# Patient Record
Sex: FEMALE | Race: WHITE | NOT HISPANIC OR LATINO | URBAN - METROPOLITAN AREA
[De-identification: names, ages, dates, MRNs, and addresses within clinical notes are randomized per-mention and may not be internally consistent; named-entity substitution may affect disease eponyms.]

---

## 2018-05-26 ENCOUNTER — INPATIENT (INPATIENT)
Facility: HOSPITAL | Age: 53
LOS: 4 days | Discharge: HOME HEALTH | End: 2018-05-31
Attending: HOSPITALIST | Admitting: HOSPITALIST
Payer: MEDICARE

## 2018-05-26 VITALS
RESPIRATION RATE: 16 BRPM | SYSTOLIC BLOOD PRESSURE: 161 MMHG | HEART RATE: 66 BPM | OXYGEN SATURATION: 95 % | TEMPERATURE: 99 F | DIASTOLIC BLOOD PRESSURE: 102 MMHG

## 2018-05-26 DIAGNOSIS — Z29.9 ENCOUNTER FOR PROPHYLACTIC MEASURES, UNSPECIFIED: ICD-10-CM

## 2018-05-26 DIAGNOSIS — Z98.890 OTHER SPECIFIED POSTPROCEDURAL STATES: Chronic | ICD-10-CM

## 2018-05-26 DIAGNOSIS — R78.81 BACTEREMIA: ICD-10-CM

## 2018-05-26 DIAGNOSIS — N39.0 URINARY TRACT INFECTION, SITE NOT SPECIFIED: ICD-10-CM

## 2018-05-26 DIAGNOSIS — Z90.49 ACQUIRED ABSENCE OF OTHER SPECIFIED PARTS OF DIGESTIVE TRACT: Chronic | ICD-10-CM

## 2018-05-26 DIAGNOSIS — Z93.6 OTHER ARTIFICIAL OPENINGS OF URINARY TRACT STATUS: Chronic | ICD-10-CM

## 2018-05-26 DIAGNOSIS — R79.89 OTHER SPECIFIED ABNORMAL FINDINGS OF BLOOD CHEMISTRY: ICD-10-CM

## 2018-05-26 DIAGNOSIS — E03.9 HYPOTHYROIDISM, UNSPECIFIED: ICD-10-CM

## 2018-05-26 DIAGNOSIS — I10 ESSENTIAL (PRIMARY) HYPERTENSION: ICD-10-CM

## 2018-05-26 DIAGNOSIS — K91.2 POSTSURGICAL MALABSORPTION, NOT ELSEWHERE CLASSIFIED: ICD-10-CM

## 2018-05-26 LAB
ALBUMIN SERPL ELPH-MCNC: 4.2 G/DL — SIGNIFICANT CHANGE UP (ref 3.3–5)
ALP SERPL-CCNC: 145 U/L — HIGH (ref 40–120)
ALT FLD-CCNC: 39 U/L — HIGH (ref 4–33)
APPEARANCE UR: SIGNIFICANT CHANGE UP
AST SERPL-CCNC: 45 U/L — HIGH (ref 4–32)
BACTERIA # UR AUTO: SIGNIFICANT CHANGE UP
BASE EXCESS BLDV CALC-SCNC: 2.7 MMOL/L — SIGNIFICANT CHANGE UP
BASOPHILS # BLD AUTO: 0.04 K/UL — SIGNIFICANT CHANGE UP (ref 0–0.2)
BASOPHILS NFR BLD AUTO: 0.7 % — SIGNIFICANT CHANGE UP (ref 0–2)
BILIRUB SERPL-MCNC: 0.3 MG/DL — SIGNIFICANT CHANGE UP (ref 0.2–1.2)
BILIRUB UR-MCNC: NEGATIVE — SIGNIFICANT CHANGE UP
BLOOD GAS VENOUS - CREATININE: 1.42 MG/DL — HIGH (ref 0.5–1.3)
BLOOD UR QL VISUAL: HIGH
BUN SERPL-MCNC: 30 MG/DL — HIGH (ref 7–23)
CALCIUM SERPL-MCNC: 9.3 MG/DL — SIGNIFICANT CHANGE UP (ref 8.4–10.5)
CHLORIDE BLDV-SCNC: 103 MMOL/L — SIGNIFICANT CHANGE UP (ref 96–108)
CHLORIDE SERPL-SCNC: 97 MMOL/L — LOW (ref 98–107)
CO2 SERPL-SCNC: 23 MMOL/L — SIGNIFICANT CHANGE UP (ref 22–31)
COLOR SPEC: YELLOW — SIGNIFICANT CHANGE UP
CREAT SERPL-MCNC: 1.41 MG/DL — HIGH (ref 0.5–1.3)
EOSINOPHIL # BLD AUTO: 0.2 K/UL — SIGNIFICANT CHANGE UP (ref 0–0.5)
EOSINOPHIL NFR BLD AUTO: 3.6 % — SIGNIFICANT CHANGE UP (ref 0–6)
GAS PNL BLDV: 137 MMOL/L — SIGNIFICANT CHANGE UP (ref 136–146)
GLUCOSE BLDV-MCNC: 91 — SIGNIFICANT CHANGE UP (ref 70–99)
GLUCOSE SERPL-MCNC: 92 MG/DL — SIGNIFICANT CHANGE UP (ref 70–99)
GLUCOSE UR-MCNC: NEGATIVE — SIGNIFICANT CHANGE UP
HCO3 BLDV-SCNC: 25 MMOL/L — SIGNIFICANT CHANGE UP (ref 20–27)
HCT VFR BLD CALC: 40.6 % — SIGNIFICANT CHANGE UP (ref 34.5–45)
HCT VFR BLDV CALC: 42.4 % — SIGNIFICANT CHANGE UP (ref 34.5–45)
HGB BLD-MCNC: 13.4 G/DL — SIGNIFICANT CHANGE UP (ref 11.5–15.5)
HGB BLDV-MCNC: 13.8 G/DL — SIGNIFICANT CHANGE UP (ref 11.5–15.5)
IMM GRANULOCYTES # BLD AUTO: 0.01 # — SIGNIFICANT CHANGE UP
IMM GRANULOCYTES NFR BLD AUTO: 0.2 % — SIGNIFICANT CHANGE UP (ref 0–1.5)
KETONES UR-MCNC: NEGATIVE — SIGNIFICANT CHANGE UP
LACTATE BLDV-MCNC: 1.3 MMOL/L — SIGNIFICANT CHANGE UP (ref 0.5–2)
LEUKOCYTE ESTERASE UR-ACNC: HIGH
LYMPHOCYTES # BLD AUTO: 1.03 K/UL — SIGNIFICANT CHANGE UP (ref 1–3.3)
LYMPHOCYTES # BLD AUTO: 18.6 % — SIGNIFICANT CHANGE UP (ref 13–44)
MCHC RBC-ENTMCNC: 30.5 PG — SIGNIFICANT CHANGE UP (ref 27–34)
MCHC RBC-ENTMCNC: 33 % — SIGNIFICANT CHANGE UP (ref 32–36)
MCV RBC AUTO: 92.5 FL — SIGNIFICANT CHANGE UP (ref 80–100)
MONOCYTES # BLD AUTO: 0.76 K/UL — SIGNIFICANT CHANGE UP (ref 0–0.9)
MONOCYTES NFR BLD AUTO: 13.7 % — SIGNIFICANT CHANGE UP (ref 2–14)
MUCOUS THREADS # UR AUTO: SIGNIFICANT CHANGE UP
NEUTROPHILS # BLD AUTO: 3.51 K/UL — SIGNIFICANT CHANGE UP (ref 1.8–7.4)
NEUTROPHILS NFR BLD AUTO: 63.2 % — SIGNIFICANT CHANGE UP (ref 43–77)
NITRITE UR-MCNC: NEGATIVE — SIGNIFICANT CHANGE UP
NRBC # FLD: 0 — SIGNIFICANT CHANGE UP
PCO2 BLDV: 50 MMHG — SIGNIFICANT CHANGE UP (ref 41–51)
PH BLDV: 7.37 PH — SIGNIFICANT CHANGE UP (ref 7.32–7.43)
PH UR: 6 — SIGNIFICANT CHANGE UP (ref 4.6–8)
PLATELET # BLD AUTO: 159 K/UL — SIGNIFICANT CHANGE UP (ref 150–400)
PMV BLD: 10.1 FL — SIGNIFICANT CHANGE UP (ref 7–13)
PO2 BLDV: 35 MMHG — SIGNIFICANT CHANGE UP (ref 35–40)
POTASSIUM BLDV-SCNC: 4.3 MMOL/L — SIGNIFICANT CHANGE UP (ref 3.4–4.5)
POTASSIUM SERPL-MCNC: 4.9 MMOL/L — SIGNIFICANT CHANGE UP (ref 3.5–5.3)
POTASSIUM SERPL-SCNC: 4.9 MMOL/L — SIGNIFICANT CHANGE UP (ref 3.5–5.3)
PROT SERPL-MCNC: 8.3 G/DL — SIGNIFICANT CHANGE UP (ref 6–8.3)
PROT UR-MCNC: 500 MG/DL — HIGH
RBC # BLD: 4.39 M/UL — SIGNIFICANT CHANGE UP (ref 3.8–5.2)
RBC # FLD: 14.9 % — HIGH (ref 10.3–14.5)
RBC CASTS # UR COMP ASSIST: >50 — HIGH (ref 0–?)
SAO2 % BLDV: 60.8 % — SIGNIFICANT CHANGE UP (ref 60–85)
SODIUM SERPL-SCNC: 136 MMOL/L — SIGNIFICANT CHANGE UP (ref 135–145)
SP GR SPEC: 1.02 — SIGNIFICANT CHANGE UP (ref 1–1.04)
UROBILINOGEN FLD QL: NORMAL MG/DL — SIGNIFICANT CHANGE UP
WBC # BLD: 5.55 K/UL — SIGNIFICANT CHANGE UP (ref 3.8–10.5)
WBC # FLD AUTO: 5.55 K/UL — SIGNIFICANT CHANGE UP (ref 3.8–10.5)
WBC UR QL: >50 — HIGH (ref 0–?)

## 2018-05-26 PROCEDURE — 99223 1ST HOSP IP/OBS HIGH 75: CPT | Mod: GC

## 2018-05-26 RX ORDER — VANCOMYCIN HCL 1 G
1000 VIAL (EA) INTRAVENOUS EVERY 12 HOURS
Qty: 0 | Refills: 0 | Status: DISCONTINUED | OUTPATIENT
Start: 2018-05-26 | End: 2018-05-31

## 2018-05-26 RX ORDER — GABAPENTIN 400 MG/1
300 CAPSULE ORAL AT BEDTIME
Qty: 0 | Refills: 0 | Status: DISCONTINUED | OUTPATIENT
Start: 2018-05-26 | End: 2018-05-31

## 2018-05-26 RX ORDER — MEROPENEM 1 G/30ML
1000 INJECTION INTRAVENOUS EVERY 8 HOURS
Qty: 0 | Refills: 0 | Status: DISCONTINUED | OUTPATIENT
Start: 2018-05-26 | End: 2018-05-26

## 2018-05-26 RX ORDER — ACETAMINOPHEN 500 MG
650 TABLET ORAL EVERY 6 HOURS
Qty: 0 | Refills: 0 | Status: DISCONTINUED | OUTPATIENT
Start: 2018-05-26 | End: 2018-05-28

## 2018-05-26 RX ORDER — MEROPENEM 1 G/30ML
1000 INJECTION INTRAVENOUS ONCE
Qty: 0 | Refills: 0 | Status: COMPLETED | OUTPATIENT
Start: 2018-05-26 | End: 2018-05-26

## 2018-05-26 RX ORDER — ARIPIPRAZOLE 15 MG/1
2 TABLET ORAL AT BEDTIME
Qty: 0 | Refills: 0 | Status: DISCONTINUED | OUTPATIENT
Start: 2018-05-26 | End: 2018-05-31

## 2018-05-26 RX ORDER — HYDROCHLOROTHIAZIDE 25 MG
12.5 TABLET ORAL ONCE
Qty: 0 | Refills: 0 | Status: COMPLETED | OUTPATIENT
Start: 2018-05-26 | End: 2018-05-26

## 2018-05-26 RX ORDER — DIPHENOXYLATE HCL/ATROPINE 2.5-.025MG
2 TABLET ORAL
Qty: 0 | Refills: 0 | COMMUNITY

## 2018-05-26 RX ORDER — LEVOTHYROXINE SODIUM 125 MCG
137 TABLET ORAL DAILY
Qty: 0 | Refills: 0 | Status: DISCONTINUED | OUTPATIENT
Start: 2018-05-26 | End: 2018-05-31

## 2018-05-26 RX ORDER — LEVOTHYROXINE SODIUM 125 MCG
1 TABLET ORAL
Qty: 0 | Refills: 0 | COMMUNITY

## 2018-05-26 RX ORDER — TRAZODONE HCL 50 MG
200 TABLET ORAL AT BEDTIME
Qty: 0 | Refills: 0 | Status: DISCONTINUED | OUTPATIENT
Start: 2018-05-26 | End: 2018-05-31

## 2018-05-26 RX ORDER — GABAPENTIN 400 MG/1
1 CAPSULE ORAL
Qty: 0 | Refills: 0 | COMMUNITY

## 2018-05-26 RX ORDER — DULOXETINE HYDROCHLORIDE 30 MG/1
1 CAPSULE, DELAYED RELEASE ORAL
Qty: 0 | Refills: 0 | COMMUNITY

## 2018-05-26 RX ORDER — OMEPRAZOLE 10 MG/1
1 CAPSULE, DELAYED RELEASE ORAL
Qty: 0 | Refills: 0 | COMMUNITY

## 2018-05-26 RX ORDER — FERROUS SULFATE 325(65) MG
1 TABLET ORAL
Qty: 0 | Refills: 0 | COMMUNITY

## 2018-05-26 RX ORDER — SODIUM CHLORIDE 9 MG/ML
1000 INJECTION, SOLUTION INTRAVENOUS
Qty: 0 | Refills: 0 | Status: DISCONTINUED | OUTPATIENT
Start: 2018-05-26 | End: 2018-05-27

## 2018-05-26 RX ORDER — HEPARIN SODIUM 5000 [USP'U]/ML
5000 INJECTION INTRAVENOUS; SUBCUTANEOUS EVERY 8 HOURS
Qty: 0 | Refills: 0 | Status: DISCONTINUED | OUTPATIENT
Start: 2018-05-26 | End: 2018-05-31

## 2018-05-26 RX ORDER — DULOXETINE HYDROCHLORIDE 30 MG/1
2 CAPSULE, DELAYED RELEASE ORAL
Qty: 0 | Refills: 0 | COMMUNITY

## 2018-05-26 RX ORDER — AMLODIPINE BESYLATE 2.5 MG/1
10 TABLET ORAL AT BEDTIME
Qty: 0 | Refills: 0 | Status: DISCONTINUED | OUTPATIENT
Start: 2018-05-26 | End: 2018-05-31

## 2018-05-26 RX ORDER — AMLODIPINE BESYLATE 2.5 MG/1
1 TABLET ORAL
Qty: 0 | Refills: 0 | COMMUNITY

## 2018-05-26 RX ORDER — DULOXETINE HYDROCHLORIDE 30 MG/1
60 CAPSULE, DELAYED RELEASE ORAL
Qty: 0 | Refills: 0 | Status: DISCONTINUED | OUTPATIENT
Start: 2018-05-26 | End: 2018-05-31

## 2018-05-26 RX ORDER — LOPERAMIDE HCL 2 MG
2 TABLET ORAL
Qty: 0 | Refills: 0 | COMMUNITY

## 2018-05-26 RX ORDER — LIPASE/PROTEASE/AMYLASE 16-48-48K
1 CAPSULE,DELAYED RELEASE (ENTERIC COATED) ORAL
Qty: 0 | Refills: 0 | Status: DISCONTINUED | OUTPATIENT
Start: 2018-05-26 | End: 2018-05-28

## 2018-05-26 RX ORDER — SODIUM BICARBONATE 1 MEQ/ML
1 SYRINGE (ML) INTRAVENOUS
Qty: 0 | Refills: 0 | COMMUNITY

## 2018-05-26 RX ORDER — AMLODIPINE BESYLATE 2.5 MG/1
5 TABLET ORAL ONCE
Qty: 0 | Refills: 0 | Status: COMPLETED | OUTPATIENT
Start: 2018-05-26 | End: 2018-05-26

## 2018-05-26 RX ORDER — TRAZODONE HCL 50 MG
2 TABLET ORAL
Qty: 0 | Refills: 0 | COMMUNITY

## 2018-05-26 RX ORDER — DIPHENOXYLATE HCL/ATROPINE 2.5-.025MG
2 TABLET ORAL THREE TIMES A DAY
Qty: 0 | Refills: 0 | Status: DISCONTINUED | OUTPATIENT
Start: 2018-05-26 | End: 2018-05-31

## 2018-05-26 RX ORDER — MEROPENEM 1 G/30ML
1000 INJECTION INTRAVENOUS EVERY 12 HOURS
Qty: 0 | Refills: 0 | Status: DISCONTINUED | OUTPATIENT
Start: 2018-05-26 | End: 2018-05-31

## 2018-05-26 RX ORDER — FERROUS SULFATE 325(65) MG
325 TABLET ORAL DAILY
Qty: 0 | Refills: 0 | Status: DISCONTINUED | OUTPATIENT
Start: 2018-05-26 | End: 2018-05-31

## 2018-05-26 RX ORDER — ASCORBIC ACID 60 MG
500 TABLET,CHEWABLE ORAL DAILY
Qty: 0 | Refills: 0 | Status: DISCONTINUED | OUTPATIENT
Start: 2018-05-26 | End: 2018-05-31

## 2018-05-26 RX ORDER — LOPERAMIDE HCL 2 MG
2 TABLET ORAL THREE TIMES A DAY
Qty: 0 | Refills: 0 | Status: DISCONTINUED | OUTPATIENT
Start: 2018-05-26 | End: 2018-05-31

## 2018-05-26 RX ORDER — DULOXETINE HYDROCHLORIDE 30 MG/1
60 CAPSULE, DELAYED RELEASE ORAL DAILY
Qty: 0 | Refills: 0 | Status: DISCONTINUED | OUTPATIENT
Start: 2018-05-26 | End: 2018-05-26

## 2018-05-26 RX ORDER — ERGOCALCIFEROL 1.25 MG/1
50000 CAPSULE ORAL DAILY
Qty: 0 | Refills: 0 | Status: DISCONTINUED | OUTPATIENT
Start: 2018-05-26 | End: 2018-05-28

## 2018-05-26 RX ORDER — ARIPIPRAZOLE 15 MG/1
1 TABLET ORAL
Qty: 0 | Refills: 0 | COMMUNITY

## 2018-05-26 RX ORDER — SODIUM BICARBONATE 1 MEQ/ML
650 SYRINGE (ML) INTRAVENOUS
Qty: 0 | Refills: 0 | Status: DISCONTINUED | OUTPATIENT
Start: 2018-05-26 | End: 2018-05-31

## 2018-05-26 RX ORDER — SODIUM CHLORIDE 9 MG/ML
1000 INJECTION INTRAMUSCULAR; INTRAVENOUS; SUBCUTANEOUS ONCE
Qty: 0 | Refills: 0 | Status: COMPLETED | OUTPATIENT
Start: 2018-05-26 | End: 2018-05-26

## 2018-05-26 RX ORDER — HYDROMORPHONE HYDROCHLORIDE 2 MG/ML
1 INJECTION INTRAMUSCULAR; INTRAVENOUS; SUBCUTANEOUS ONCE
Qty: 0 | Refills: 0 | Status: DISCONTINUED | OUTPATIENT
Start: 2018-05-26 | End: 2018-05-26

## 2018-05-26 RX ORDER — VANCOMYCIN HCL 1 G
1000 VIAL (EA) INTRAVENOUS ONCE
Qty: 0 | Refills: 0 | Status: COMPLETED | OUTPATIENT
Start: 2018-05-26 | End: 2018-05-26

## 2018-05-26 RX ORDER — PANTOPRAZOLE SODIUM 20 MG/1
40 TABLET, DELAYED RELEASE ORAL
Qty: 0 | Refills: 0 | Status: DISCONTINUED | OUTPATIENT
Start: 2018-05-26 | End: 2018-05-31

## 2018-05-26 RX ORDER — HYDRALAZINE HCL 50 MG
20 TABLET ORAL
Qty: 0 | Refills: 0 | Status: DISCONTINUED | OUTPATIENT
Start: 2018-05-26 | End: 2018-05-27

## 2018-05-26 RX ADMIN — AMLODIPINE BESYLATE 10 MILLIGRAM(S): 2.5 TABLET ORAL at 22:57

## 2018-05-26 RX ADMIN — GABAPENTIN 300 MILLIGRAM(S): 400 CAPSULE ORAL at 22:57

## 2018-05-26 RX ADMIN — SODIUM CHLORIDE 100 MILLILITER(S): 9 INJECTION, SOLUTION INTRAVENOUS at 23:58

## 2018-05-26 RX ADMIN — SODIUM CHLORIDE 1000 MILLILITER(S): 9 INJECTION INTRAMUSCULAR; INTRAVENOUS; SUBCUTANEOUS at 16:51

## 2018-05-26 RX ADMIN — AMLODIPINE BESYLATE 5 MILLIGRAM(S): 2.5 TABLET ORAL at 16:51

## 2018-05-26 RX ADMIN — HYDROMORPHONE HYDROCHLORIDE 1 MILLIGRAM(S): 2 INJECTION INTRAMUSCULAR; INTRAVENOUS; SUBCUTANEOUS at 18:30

## 2018-05-26 RX ADMIN — Medication 12.5 MILLIGRAM(S): at 16:51

## 2018-05-26 RX ADMIN — HYDROMORPHONE HYDROCHLORIDE 1 MILLIGRAM(S): 2 INJECTION INTRAMUSCULAR; INTRAVENOUS; SUBCUTANEOUS at 18:50

## 2018-05-26 RX ADMIN — Medication 2 TABLET(S): at 22:58

## 2018-05-26 RX ADMIN — DULOXETINE HYDROCHLORIDE 60 MILLIGRAM(S): 30 CAPSULE, DELAYED RELEASE ORAL at 23:57

## 2018-05-26 RX ADMIN — ARIPIPRAZOLE 2 MILLIGRAM(S): 15 TABLET ORAL at 23:57

## 2018-05-26 RX ADMIN — MEROPENEM 100 MILLIGRAM(S): 1 INJECTION INTRAVENOUS at 17:54

## 2018-05-26 RX ADMIN — Medication 2 MILLIGRAM(S): at 22:57

## 2018-05-26 RX ADMIN — Medication 650 MILLIGRAM(S): at 23:59

## 2018-05-26 RX ADMIN — Medication 250 MILLIGRAM(S): at 18:42

## 2018-05-26 RX ADMIN — Medication 200 MILLIGRAM(S): at 23:57

## 2018-05-26 NOTE — H&P ADULT - NSHPLABSRESULTS_GEN_ALL_CORE
The Labs were reviewed by me   The Radiology was reviewed by me            136  |  97<L>  |  30<H>  ----------------------------<  92  4.9   |  23  |  1.41<H>    Ca    9.3      26 May 2018 16:20    TPro  8.3  /  Alb  4.2  /  TBili  0.3  /  DBili  x   /  AST  45<H>  /  ALT  39<H>  /  AlkPhos  145<H>                          Urinalysis Basic - ( 26 May 2018 16:40 )    Color: YELLOW / Appearance: TURBID / S.023 / pH: 6.0  Gluc: NEGATIVE / Ketone: NEGATIVE  / Bili: NEGATIVE / Urobili: NORMAL mg/dL   Blood: MODERATE / Protein: 500 mg/dL / Nitrite: NEGATIVE   Leuk Esterase: LARGE / RBC: >50 / WBC >50   Sq Epi: x / Non Sq Epi: x / Bacteria: MOD                                       13.4   5.55  )-----------( 159      ( 26 May 2018 16:20 )             40.6       Auto Basophil # 0.04  Auto Basophil % 0.7  Auto Eosinophil# 0.20  Auto Eosinophil %3.6  Auto Lymph # 1.03  Auto Lymph % 18.6  Auto Mono # 0.76  Auto Mono % 13.7  Auto Neutrophil # 3.51  Auto Neutrophil % 63.2  Band Neutrophils % --      [facility] Records: : Gram positive cocci and Gram negative bacilli

## 2018-05-26 NOTE — ED ADULT TRIAGE NOTE - CHIEF COMPLAINT QUOTE
Sent here from MD for bacteria found in blood cultures. Visiting daughter from [place]. h/o colon ca with resection. Has nephrostomy, jejunostomy, ileo conduit. Yellow pus noted in urine. c/o night sweats and right flank pain.

## 2018-05-26 NOTE — ED PROVIDER NOTE - ATTENDING CONTRIBUTION TO CARE
Alycia: 51 yo female with a h/o hypertension, hypothyroidism, colon cancer with ileostomy, jejunostomy, and nephrostomy sent in by her PMD for positive blood cultures drawn in [place] where she lives. Pt chronically on TPN with right sided chest wall catheter and denies pain, swelling, and erythema at the site. Pt does endorse subjective fevers and chills. No cough or SOB. No abdominal pain or change in ostomy output. + foul smelling urine output from right nephrostomy. NO nausea or vomiting. Exam; GENERAL: well appearing, NAD, HEENT: MMM, PERRLA, CARDIO: +S1/S2, no murmurs, rubs or gallops, LUNGS: CTA B/L, no wheezing, rales or rhonchi, ABD: soft, nontender, BSx4 quadrants, no guarding or rigidity. + left sided jejunostomy and ileostomy + right nephrostomy with cloudy, foul smelling urine EXT: No LE edema or calf TTP, 2+ distal pulses x 4 extremities. NEURO: AxOx3, SKIN: no rashes or lesions, well perfused; A/P 51 yo female sent in for positive blood cultures will obtain cbc, cmp, blood cultures, CXR, u/a and culture and admit.

## 2018-05-26 NOTE — H&P ADULT - PSH
H/O colectomy    H/O ileostomy    H/O jejunostomy    H/O nephrostomy H/O colectomy    H/O ileostomy    H/O jejunostomy    H/O nephrostomy  ~ right sided

## 2018-05-26 NOTE — ED PROVIDER NOTE - MEDICAL DECISION MAKING DETAILS
51 y/o F hx colon ca sp chemo and radiation sent in for pos blood cultures-states she usually is on meropenem. Plan labs, repeat cultures admit

## 2018-05-26 NOTE — CHART NOTE - NSCHARTNOTEFT_GEN_A_CORE
Patient unable to receive TPN due to inability to verify by pharmacy due to lack of staffing. Verification can only be done in the morning and would take until 4pm. Pharmacy did state the patient could take the TPN from home, but could be not be marked off in sunrise but only in the paper chart. From a this provider ok for patient to use her won TPN material and supplemental bag, however aware of hesitance by nursing to administer this needed medication will hold off for now due to concern from nursing. Will defer to the AM for now and start patient on maintain fluids.     Mike Broussard MD PGY2   [facility] Pager #: [phone]  [facility] Pager #: [phone]

## 2018-05-26 NOTE — H&P ADULT - PROBLEM SELECTOR PLAN 2
- Pt endorses foul smelling urine, cloudy urine and pus from the nephrostomy tube following exchange   - UA grossly positive with pyuria and large LE  - c/w Deep and vanco  - f/u on blood and urine cultures

## 2018-05-26 NOTE — ED PROVIDER NOTE - OBJECTIVE STATEMENT
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 Pt states she is scheduled for another surgery, had a nurse come home to draw routine labs/cultures. Pt is visiting from [place], received a call from the lab to go to the ER for pos blood cultures when she drove into [place]. Pt admits to foul smelling urine from her nephrostomy, which is unusual for her. No other complaints. Follows at the [facility]. Denies fevers, chills, N/V, or any other complaints.

## 2018-05-26 NOTE — H&P ADULT - ASSESSMENT
52 y.o. female with colon cancer s/p resection/chemo/ radiation (2007) with jejunostomy, ileostomy c/b short gut syndrome, right sided nephrostomy tube, HTN, hypothyroidism admitted for bacteremia possible due to UTI.

## 2018-05-26 NOTE — H&P ADULT - PROBLEM SELECTOR PLAN 4
- due to multiple surgeries from colon cancer, ileostomy and jejunostomy   - Informed patient to bring in TPN supplies when needed. Receives lipids on Fridays, and supplemental nutrition on Saturday  - c/w Loperamide and lomotil - due to multiple surgeries from colon cancer, ileostomy and jejunostomy   - Informed patient to bring in TPN supplies when needed. Receives lipids on Fridays, and supplemental nutrition on Saturday  - c/w Loperamide and lomotil  - continues on vitamin supplements  - Hgb no sign of iron deficiency (Hgb = 13.4, although may be mildly dehydrated)

## 2018-05-26 NOTE — ED PROVIDER NOTE - ABDOMINAL EXAM
soft/nontender.../Stoma sites of jejunostomy and ileostomy w/o surrounding erythema. Nephrostomy draining thick purulent drainage/nondistended nontender.../soft/Stoma sites of jejunostomy and ileostomy w/o surrounding erythema. Nephrostomy draining thick purulent urine/nondistended

## 2018-05-26 NOTE — H&P ADULT - PMH
Colon cancer    HTN (hypertension)    Hypothyroid Colon cancer  ~ s/p resection, chemotherapy, radiation therapy  HTN (hypertension)    Hypothyroid

## 2018-05-26 NOTE — H&P ADULT - NSHPSOCIALHISTORY_GEN_ALL_CORE
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

## 2018-05-26 NOTE — H&P ADULT - HISTORY OF PRESENT ILLNESS
52 y.o. female with colon cancer s/p resection/chemo/ radiation (2007) with jejunostomy, ileostomy c/b short gut syndrome, right sided nephrostomy tube, HTN, hypothyroidism presenting from home with positive blood cultures. The patient states earlier in the week the patient was having night sweats and low grade temperature of 99.1 F which she reported to her physician Also stated her ID doctor noticed an increase her BUN/Cr as well. Blood cultures were collected on the 5/25. The patient also endorsed cloudy urine with worsening smell from her nephrostomy bag.  The patient had recent exchange of her nephrostomy tube on 5/22, stated after the exchange the patient noticed "pus-like" material after the first day, then cleared up afterwards. Also endorsed one episode of vomiting today. Along with PO intake the patient also receives TPN and fluid supplementation as during the week, which her family is trained in providing via a Samuel Catheter. The patient is originally from [place], [place] and is in [place] for vacation visiting her daughter. No sick contacts recently. 52 y.o. female with colon cancer s/p resection/chemo/ radiation (2007) with jejunostomy, ileostomy c/b short gut syndrome, right sided nephrostomy tube, HTN, hypothyroidism presenting from home with positive blood cultures. The patient states earlier in the week the patient was having night sweats and low grade temperature of 99.1 F which she reported to her physician Also stated her ID doctor noticed an increase her BUN/Cr as well. Blood cultures were collected on the 5/25. The patient also endorsed cloudy urine with worsening smell from her nephrostomy bag.  The patient had recent exchange of her nephrostomy tube on 5/22, stated after the exchange the patient noticed "pus-like" material after the first day, then cleared up afterwards. Also endorsed one episode of vomiting today. Patient has a history of bacteremia, 3 episodes over the last year, needing IV abx. Along with PO intake the patient also receives TPN and fluid supplementation as during the week, which her family is trained in providing via a Samuel Catheter. The patient is originally from [place], [place] and is in [place] for vacation visiting her daughter. No sick contacts recently.

## 2018-05-26 NOTE — H&P ADULT - PROBLEM SELECTOR PLAN 1
- Patient reported to have positive blood cultures with GPC and GNR as an outpatient   - Pt has nonspecific symptoms (night sweats, low grade temperature)  - possible sources includes UTI vs catheter line infection   - Most likely source is UTI given the pyuria and large LE on the UA   - would cover broadly for now given pt's history of recurrent bacteremia needing IV abx  - c/w Deep and Vancomycin   - f/u Blood and urine cultures   - would contact the [facility] in the AM to follow up on speciation and sensitivities - Patient reported to have positive blood cultures with GPC and GNR as an outpatient   - Pt has nonspecific symptoms (night sweats, low grade temperature)  - possible sources includes UTI vs catheter line infection   - Most likely source is UTI given the turbid urine pyuria and large LE on the UA   - would cover broadly for now, given pt's history of recurrent bacteremia needing IV abx  - c/w Deep and Vancomycin   - f/u Blood and urine cultures   - would contact the [facility] in the AM to follow up on speciation and sensitivities  - IVF hydration

## 2018-05-26 NOTE — H&P ADULT - PROBLEM SELECTOR PLAN 3
- Current Cr is 1.41  - unknown baseline   - would continue to monitor - Current Cr is 1.41  - unknown baseline   - IVF LR at 100 mL/Hr x 15 hours (especially since patient also receives supplemental fluids via Samuel catheter ~ twice weekly)  - would continue to monitor

## 2018-05-26 NOTE — H&P ADULT - NSHPREVIEWOFSYSTEMS_GEN_ALL_CORE
REVIEW OF SYSTEMS:  CONSTITUTIONAL: No fever, chills, night sweats, or fatigue  EYES: No eye pain, visual disturbances, or discharge  ENMT:  No difficulty hearing, tinnitus, vertigo; No sinus or throat pain  BREASTS: No pain, masses, or nipple discharge  RESPIRATORY: No wheezing, or hemoptysis; No shortness of breath. +cough  CARDIOVASCULAR: No chest pain, palpitations, dizziness, or leg swelling  GASTROINTESTINAL: No abdominal or epigastric pain. No nausea, or hematemesis; No diarrhea or constipation. No melena or hematochezia. +vomiting   GENITOURINARY: No dysuria, frequency, hematuria, or incontinence. +foul smelling urine   NEUROLOGICAL: No headaches, memory loss, loss of strength, numbness, or tremors  SKIN: No itching, burning, rashes, or lesions   LYMPH NODES: No enlarged glands  ENDOCRINE: No heat or cold intolerance; No hair loss  MUSCULOSKELETAL: No joint pain or swelling; No muscle, back, or extremity pain  PSYCHIATRIC: No depression, anxiety, mood swings, or difficulty sleeping  HEME/LYMPH: No easy bruising, or bleeding gums  ALLERY AND IMMUNOLOGIC: No hives or eczema

## 2018-05-26 NOTE — ED PROVIDER NOTE - PROGRESS NOTE DETAILS
CHRISTINA Vieyra: Spoke to the lab at [facility], prelim blood cultures showing gram pos cocci and gram neg bacilli in one bottle at ~16hours. Lab results faxed over and in patient's chart.

## 2018-05-26 NOTE — H&P ADULT - PROBLEM SELECTOR PLAN 5
- c/w hydralazine and amlodipine - elevated to 179/102 since admission  - c/w hydralazine and amlodipine  - follow with repeat measurements

## 2018-05-26 NOTE — H&P ADULT - NSHPPHYSICALEXAM_GEN_ALL_CORE
Vital Signs Last 24 Hrs  T(C): 37.1 (26 May 2018 15:26), Max: 37.1 (26 May 2018 15:26)  T(F): 98.7 (26 May 2018 15:26), Max: 98.7 (26 May 2018 15:26)  HR: 79 (26 May 2018 18:44) (66 - 79)  BP: 154/83 (26 May 2018 18:44) (154/83 - 179/102)  BP(mean): --  RR: 16 (26 May 2018 18:44) (16 - 18)  SpO2: 99% (26 May 2018 18:44) (95% - 100%)    PHYSICAL EXAM:  GENERAL: NAD, well-groomed, well-developed  EYES: EOMI, PERRLA, conjunctiva and sclera clear  ENMT: No tonsillar erythema, exudates, or enlargement; Moist mucous membranes, Good dentition, No lesions  CHEST/LUNG: Clear to percussion bilaterally; No rales, rhonchi, wheezing, or rubs. Samuel line on the right chest wall, no erythema or tenderness   HEART: Regular rate and rhythm; No murmurs, rubs, or gallops  ABDOMEN: Soft, Nontender, Nondistended; Bowel sounds present. Jejunotomy bag in place, no erthyema. Right side nephrotostomy tube in place, no tenderness or erythema. No suprapubic tenderness   VASCULAR:  2+ Peripheral Pulses, No clubbing, cyanosis, or edema  SKIN: No rashes or lesions  NERVOUS SYSTEM:  Alert & Oriented X3, Good concentration Vital Signs Last 24 Hrs  T(C): 37.1 (26 May 2018 15:26), Max: 37.1 (26 May 2018 15:26)  T(F): 98.7 (26 May 2018 15:26), Max: 98.7 (26 May 2018 15:26)  HR: 79 (26 May 2018 18:44) (66 - 79)  BP: 154/83 (26 May 2018 18:44) (154/83 - 179/102)  BP(mean): --  RR: 16 (26 May 2018 18:44) (16 - 18)  SpO2: 99% (26 May 2018 18:44) (95% - 100%)    PHYSICAL EXAM:  GENERAL: NAD, well-groomed, well-developed  EYES: EOMI, PERRLA, conjunctiva and sclera clear  ENMT: No tonsillar erythema, exudates, or enlargement; Moist mucous membranes, Good dentition, No lesions  CHEST/LUNG: Clear to percussion bilaterally; No rales, rhonchi, wheezing, or rubs. Samuel line on the right chest wall, no erythema or tenderness   HEART: Regular rate and rhythm; No murmurs, rubs, or gallops  ABDOMEN: Soft, Nontender, Nondistended; Bowel sounds present. Jejunotomy bag in place, no erythema. Right side nephrostomy tube in place, no tenderness or erythema. No suprapubic tenderness   VASCULAR:  2+ Peripheral Pulses, No clubbing, cyanosis, or edema  SKIN: No rashes or lesions  NERVOUS SYSTEM:  Alert & Oriented X3, Good concentration

## 2018-05-27 LAB
BASOPHILS # BLD AUTO: 0.02 K/UL — SIGNIFICANT CHANGE UP (ref 0–0.2)
BASOPHILS NFR BLD AUTO: 0.5 % — SIGNIFICANT CHANGE UP (ref 0–2)
BUN SERPL-MCNC: 25 MG/DL — HIGH (ref 7–23)
CALCIUM SERPL-MCNC: 8.4 MG/DL — SIGNIFICANT CHANGE UP (ref 8.4–10.5)
CHLORIDE SERPL-SCNC: 100 MMOL/L — SIGNIFICANT CHANGE UP (ref 98–107)
CO2 SERPL-SCNC: 24 MMOL/L — SIGNIFICANT CHANGE UP (ref 22–31)
CREAT SERPL-MCNC: 1.43 MG/DL — HIGH (ref 0.5–1.3)
EOSINOPHIL # BLD AUTO: 0.22 K/UL — SIGNIFICANT CHANGE UP (ref 0–0.5)
EOSINOPHIL NFR BLD AUTO: 5.1 % — SIGNIFICANT CHANGE UP (ref 0–6)
GLUCOSE SERPL-MCNC: 126 MG/DL — HIGH (ref 70–99)
HCT VFR BLD CALC: 35.7 % — SIGNIFICANT CHANGE UP (ref 34.5–45)
HGB BLD-MCNC: 11.7 G/DL — SIGNIFICANT CHANGE UP (ref 11.5–15.5)
IMM GRANULOCYTES # BLD AUTO: 0.01 # — SIGNIFICANT CHANGE UP
IMM GRANULOCYTES NFR BLD AUTO: 0.2 % — SIGNIFICANT CHANGE UP (ref 0–1.5)
LYMPHOCYTES # BLD AUTO: 0.47 K/UL — LOW (ref 1–3.3)
LYMPHOCYTES # BLD AUTO: 10.9 % — LOW (ref 13–44)
MAGNESIUM SERPL-MCNC: 1.7 MG/DL — SIGNIFICANT CHANGE UP (ref 1.6–2.6)
MCHC RBC-ENTMCNC: 30.7 PG — SIGNIFICANT CHANGE UP (ref 27–34)
MCHC RBC-ENTMCNC: 32.8 % — SIGNIFICANT CHANGE UP (ref 32–36)
MCV RBC AUTO: 93.7 FL — SIGNIFICANT CHANGE UP (ref 80–100)
MONOCYTES # BLD AUTO: 0.54 K/UL — SIGNIFICANT CHANGE UP (ref 0–0.9)
MONOCYTES NFR BLD AUTO: 12.6 % — SIGNIFICANT CHANGE UP (ref 2–14)
NEUTROPHILS # BLD AUTO: 3.04 K/UL — SIGNIFICANT CHANGE UP (ref 1.8–7.4)
NEUTROPHILS NFR BLD AUTO: 70.7 % — SIGNIFICANT CHANGE UP (ref 43–77)
NRBC # FLD: 0 — SIGNIFICANT CHANGE UP
PHOSPHATE SERPL-MCNC: 4 MG/DL — SIGNIFICANT CHANGE UP (ref 2.5–4.5)
PLATELET # BLD AUTO: 130 K/UL — LOW (ref 150–400)
PMV BLD: 10.1 FL — SIGNIFICANT CHANGE UP (ref 7–13)
POTASSIUM SERPL-MCNC: 4.1 MMOL/L — SIGNIFICANT CHANGE UP (ref 3.5–5.3)
POTASSIUM SERPL-SCNC: 4.1 MMOL/L — SIGNIFICANT CHANGE UP (ref 3.5–5.3)
RBC # BLD: 3.81 M/UL — SIGNIFICANT CHANGE UP (ref 3.8–5.2)
RBC # FLD: 15 % — HIGH (ref 10.3–14.5)
SODIUM SERPL-SCNC: 137 MMOL/L — SIGNIFICANT CHANGE UP (ref 135–145)
SPECIMEN SOURCE: SIGNIFICANT CHANGE UP
TSH SERPL-MCNC: 4.63 UIU/ML — HIGH (ref 0.27–4.2)
WBC # BLD: 4.3 K/UL — SIGNIFICANT CHANGE UP (ref 3.8–10.5)
WBC # FLD AUTO: 4.3 K/UL — SIGNIFICANT CHANGE UP (ref 3.8–10.5)

## 2018-05-27 PROCEDURE — 99233 SBSQ HOSP IP/OBS HIGH 50: CPT | Mod: GC

## 2018-05-27 PROCEDURE — 99222 1ST HOSP IP/OBS MODERATE 55: CPT

## 2018-05-27 RX ORDER — HYDRALAZINE HCL 50 MG
20 TABLET ORAL THREE TIMES A DAY
Qty: 0 | Refills: 0 | Status: DISCONTINUED | OUTPATIENT
Start: 2018-05-27 | End: 2018-05-28

## 2018-05-27 RX ORDER — ACETAMINOPHEN 500 MG
650 TABLET ORAL ONCE
Qty: 0 | Refills: 0 | Status: COMPLETED | OUTPATIENT
Start: 2018-05-27 | End: 2018-05-27

## 2018-05-27 RX ORDER — SODIUM CHLORIDE 9 MG/ML
1000 INJECTION, SOLUTION INTRAVENOUS
Qty: 0 | Refills: 0 | Status: DISCONTINUED | OUTPATIENT
Start: 2018-05-27 | End: 2018-05-30

## 2018-05-27 RX ORDER — DIPHENHYDRAMINE HCL 50 MG
25 CAPSULE ORAL ONCE
Qty: 0 | Refills: 0 | Status: COMPLETED | OUTPATIENT
Start: 2018-05-27 | End: 2018-05-27

## 2018-05-27 RX ORDER — CHLORHEXIDINE GLUCONATE 213 G/1000ML
1 SOLUTION TOPICAL
Qty: 0 | Refills: 0 | Status: DISCONTINUED | OUTPATIENT
Start: 2018-05-27 | End: 2018-05-31

## 2018-05-27 RX ORDER — TRAMADOL HYDROCHLORIDE 50 MG/1
25 TABLET ORAL ONCE
Qty: 0 | Refills: 0 | Status: DISCONTINUED | OUTPATIENT
Start: 2018-05-27 | End: 2018-05-27

## 2018-05-27 RX ORDER — LACTOBACILLUS ACIDOPHILUS 100MM CELL
1 CAPSULE ORAL DAILY
Qty: 0 | Refills: 0 | Status: DISCONTINUED | OUTPATIENT
Start: 2018-05-27 | End: 2018-05-31

## 2018-05-27 RX ADMIN — Medication 1 TABLET(S): at 13:25

## 2018-05-27 RX ADMIN — Medication 20 MILLIGRAM(S): at 14:16

## 2018-05-27 RX ADMIN — Medication 650 MILLIGRAM(S): at 18:40

## 2018-05-27 RX ADMIN — ARIPIPRAZOLE 2 MILLIGRAM(S): 15 TABLET ORAL at 22:36

## 2018-05-27 RX ADMIN — Medication 260 MILLIGRAM(S): at 18:16

## 2018-05-27 RX ADMIN — Medication 650 MILLIGRAM(S): at 17:29

## 2018-05-27 RX ADMIN — MEROPENEM 100 MILLIGRAM(S): 1 INJECTION INTRAVENOUS at 07:01

## 2018-05-27 RX ADMIN — DULOXETINE HYDROCHLORIDE 60 MILLIGRAM(S): 30 CAPSULE, DELAYED RELEASE ORAL at 17:33

## 2018-05-27 RX ADMIN — Medication 2 TABLET(S): at 22:10

## 2018-05-27 RX ADMIN — Medication 650 MILLIGRAM(S): at 09:33

## 2018-05-27 RX ADMIN — Medication 2 TABLET(S): at 13:25

## 2018-05-27 RX ADMIN — Medication 2 MILLIGRAM(S): at 22:10

## 2018-05-27 RX ADMIN — Medication 250 MILLIGRAM(S): at 17:28

## 2018-05-27 RX ADMIN — TRAMADOL HYDROCHLORIDE 25 MILLIGRAM(S): 50 TABLET ORAL at 02:02

## 2018-05-27 RX ADMIN — GABAPENTIN 300 MILLIGRAM(S): 400 CAPSULE ORAL at 22:14

## 2018-05-27 RX ADMIN — Medication 325 MILLIGRAM(S): at 13:25

## 2018-05-27 RX ADMIN — Medication 137 MICROGRAM(S): at 05:47

## 2018-05-27 RX ADMIN — Medication 25 MILLIGRAM(S): at 02:02

## 2018-05-27 RX ADMIN — Medication 650 MILLIGRAM(S): at 00:55

## 2018-05-27 RX ADMIN — Medication 20 MILLIGRAM(S): at 22:11

## 2018-05-27 RX ADMIN — Medication 650 MILLIGRAM(S): at 19:05

## 2018-05-27 RX ADMIN — Medication 250 MILLIGRAM(S): at 05:46

## 2018-05-27 RX ADMIN — Medication 650 MILLIGRAM(S): at 05:46

## 2018-05-27 RX ADMIN — Medication 1 TABLET(S): at 13:26

## 2018-05-27 RX ADMIN — DULOXETINE HYDROCHLORIDE 60 MILLIGRAM(S): 30 CAPSULE, DELAYED RELEASE ORAL at 05:46

## 2018-05-27 RX ADMIN — MEROPENEM 100 MILLIGRAM(S): 1 INJECTION INTRAVENOUS at 17:28

## 2018-05-27 RX ADMIN — Medication 500 MILLIGRAM(S): at 13:26

## 2018-05-27 RX ADMIN — Medication 2 MILLIGRAM(S): at 05:46

## 2018-05-27 RX ADMIN — PANTOPRAZOLE SODIUM 40 MILLIGRAM(S): 20 TABLET, DELAYED RELEASE ORAL at 05:46

## 2018-05-27 RX ADMIN — Medication 650 MILLIGRAM(S): at 10:56

## 2018-05-27 RX ADMIN — AMLODIPINE BESYLATE 10 MILLIGRAM(S): 2.5 TABLET ORAL at 22:12

## 2018-05-27 RX ADMIN — Medication 200 MILLIGRAM(S): at 22:36

## 2018-05-27 RX ADMIN — Medication 2 MILLIGRAM(S): at 13:27

## 2018-05-27 RX ADMIN — SODIUM CHLORIDE 100 MILLILITER(S): 9 INJECTION, SOLUTION INTRAVENOUS at 17:28

## 2018-05-27 RX ADMIN — Medication 20 MILLIGRAM(S): at 05:47

## 2018-05-27 RX ADMIN — TRAMADOL HYDROCHLORIDE 25 MILLIGRAM(S): 50 TABLET ORAL at 22:36

## 2018-05-27 RX ADMIN — Medication 2 TABLET(S): at 05:46

## 2018-05-27 RX ADMIN — CHLORHEXIDINE GLUCONATE 1 APPLICATION(S): 213 SOLUTION TOPICAL at 05:46

## 2018-05-27 RX ADMIN — SODIUM CHLORIDE 100 MILLILITER(S): 9 INJECTION, SOLUTION INTRAVENOUS at 07:02

## 2018-05-27 RX ADMIN — TRAMADOL HYDROCHLORIDE 25 MILLIGRAM(S): 50 TABLET ORAL at 03:00

## 2018-05-27 NOTE — CONSULT NOTE ADULT - SUBJECTIVE AND OBJECTIVE BOX
Infectious Diseases Consult note  Patient is a 52y old  Female who presents with a chief complaint of positive blood cultures (26 May 2018 20:08)    CARRINGTON MAGDALENO  MRN-[id number]  HPI:  52 y.o. female with colon cancer s/p resection/chemo/ radiation () with jejunostomy, ileostomy c/b short gut syndrome, right sided nephrostomy tube, HTN, hypothyroidism presenting from home with positive blood cultures. The patient states earlier in the week the patient was having night sweats and low grade temperature of 99.1 F which she reported to her physician Also stated her ID doctor noticed an increase her BUN/Cr as well. Blood cultures were collected on the . The patient also endorsed cloudy urine with worsening smell from her nephrostomy bag.  The patient had recent exchange of her nephrostomy tube on , stated after the exchange the patient noticed "pus-like" material after the first day, then cleared up afterwards. Also endorsed one episode of vomiting today. Patient has a history of bacteremia, 3 episodes over the last year, needing IV abx. Along with PO intake the patient also receives TPN and fluid supplementation as during the week, which her family is trained in providing via a Samuel Catheter. The patient is originally from [place], [place] and is in [place] for vacation visiting her daughter. No sick contacts recently. (26 May 2018 20:08)    No sick contacts. No recent travel. No recent antibiotics.     REVIEW OF SYSTEMS  All as below unless noted otherwise  General:  Denies fever and chills. 	  Ophthalmologic: Denies any visual complaints. 	  ENMT: No throat pain.  Respiratory: No cough, sputum. No shortness of breath.   Cardiovascular: No chest pain.   Gastrointestinal: No nausea or vomiting. No abdominal pain. No diarrhea.   Genitourinary: No burning urine, no frequency. No flank pain  Musculoskeletal: No joint swelling or pain.   Neurological: No confusion. 	  Skin: No rash    PAST MEDICAL & SURGICAL HISTORY:  Colon cancer: ~ s/p resection, chemotherapy, radiation therapy  Hypothyroid  HTN (hypertension)  H/O jejunostomy  H/O nephrostomy: ~ right sided  H/O colectomy  H/O ileostomy      FAMILY HISTORY:  No pertinent family history in first degree relatives      SOCIAL HISTORY:    non smoker    no alcohol    ANTIMICROBIALS:    meropenem  IVPB 1000 every 12 hours  vancomycin  IVPB 1000 every 12 hours    OTHER MEDS:    acetaminophen   Tablet 650 milliGRAM(s) Oral every 6 hours PRN  acetaminophen   Tablet. 650 milliGRAM(s) Oral every 6 hours PRN  amLODIPine   Tablet 10 milliGRAM(s) Oral at bedtime  amylase/lipase/protease  (CREON 24,000 Units) 1 Capsule(s) Oral three times a day with meals  ARIPiprazole 2 milliGRAM(s) Oral at bedtime  ascorbic acid 500 milliGRAM(s) Oral daily  chlorhexidine 4% Liquid 1 Application(s) Topical <User Schedule>  diphenoxylate/atropine 2 Tablet(s) Oral three times a day  DULoxetine 60 milliGRAM(s) Oral two times a day  ergocalciferol 56852 Unit(s) Oral daily  ferrous    sulfate 325 milliGRAM(s) Oral daily  gabapentin 300 milliGRAM(s) Oral at bedtime  heparin  Injectable 5000 Unit(s) SubCutaneous every 8 hours  hydrALAZINE 20 milliGRAM(s) Oral two times a day  lactated ringers. 1000 milliLiter(s) IV Continuous <Continuous>  lactated ringers. 1000 milliLiter(s) IV Continuous <Continuous>  lactobacillus acidophilus 1 Tablet(s) Oral daily  levothyroxine 137 MICROGram(s) Oral daily  loperamide 2 milliGRAM(s) Oral three times a day  multivitamin 1 Tablet(s) Oral daily  pantoprazole    Tablet 40 milliGRAM(s) Oral before breakfast  sodium bicarbonate 650 milliGRAM(s) Oral two times a day  traZODone 200 milliGRAM(s) Oral at bedtime    Allergies    aspirin (Hives)  Cipro (Angioedema)  Reglan (Muscle Pain)  Rocephin (Hives)  Zosyn (Hives)    Intolerances      Vital Signs Last 24 Hrs  T(C): 37 (27 May 2018 05:44), Max: 37.2 (26 May 2018 21:19)  T(F): 98.6 (27 May 2018 05:44), Max: 99 (26 May 2018 21:19)  HR: 68 (27 May 2018 05:44) (66 - 79)  BP: 125/66 (27 May 2018 05:44) (125/66 - 179/102)  BP(mean): --  RR: 18 (27 May 2018 05:44) (16 - 18)  SpO2: 98% (27 May 2018 05:44) (95% - 100%)  CAPILLARY BLOOD GLUCOSE        Daily Height in cm: 160.02 (26 May 2018 21:19)    Daily     PHYSICAL EXAM:  patient in no acute respiratory distress.  Constitutional: Comfortable. Awake and alert  Eyes: PERRL EOMI. No discharge.  ENMT: No sinus tenderness.  No pharyngeal erythema.   Neck: Supple. No thyromegaly   Respiratory:  air entry bilaterally, no wheezes, rhonchi, or crackles  Cardiovascular:S1 S2 wnl, No murmurs  Gastrointestinal: Soft, no tenderness , bowel sounds present,   Genitourinary: No suprapubic tenderness. no CVA tenderness   Musculoskeletal: No joint swelling. No edema.  Vascular: peripheral pulses felt  Neurological: No grossly focal deficits  Skin: No rash   Lymph Nodes: No palpable Lymphadenopathy   Psychiatric: Affect normal  Lines:                         11.7   4.30  )-----------( 130      ( 27 May 2018 05:30 )             35.7     WBC Count: 4.30 ( @ 05:30)  WBC Count: 5.55 ( @ 16:20)        137  |  100  |  25<H>  ----------------------------<  126<H>  4.1   |  24  |  1.43<H>    Ca    8.4      27 May 2018 05:30  Phos  4.0       Mg     1.7         TPro  8.3  /  Alb  4.2  /  TBili  0.3  /  DBili  x   /  AST  45<H>  /  ALT  39<H>  /  AlkPhos  145<H>                Urinalysis Basic - ( 26 May 2018 16:40 )    Color: YELLOW / Appearance: TURBID / S.023 / pH: 6.0  Gluc: NEGATIVE / Ketone: NEGATIVE  / Bili: NEGATIVE / Urobili: NORMAL mg/dL   Blood: MODERATE / Protein: 500 mg/dL / Nitrite: NEGATIVE   Leuk Esterase: LARGE / RBC: >50 / WBC >50   Sq Epi: x / Non Sq Epi: x / Bacteria: MOD        MICROBIOLOGY:    Culture - Urine (collected 26 May 2018 17:22)  Source: NEPHROSTOMY - RIGHT  Preliminary Report (27 May 2018 09:20):    Insufficient growth, culture re-incubated.          v  RADIOLOGY: Infectious Diseases Consult note  Patient is a 52y old  Female who presents with a chief complaint of positive blood cultures (26 May 2018 20:08)    CARRINGTON MAGDALENO  MRN-[id number]  HPI:  52 female with colon cancer s/p resection/chemotherapy/ radiation () with jejunostomy, ileostomy c/b short gut syndrome, right sided nephrostomy tube, HTN, hypothyroidism presenting from home with positive blood cultures. The patient states earlier in the week the patient was having night sweats and low grade temperature of 99.1 F which she reported to her physician Also stated her ID doctor noticed an increase her BUN/Cr as well. Blood cultures were collected on the . The patient also endorsed cloudy urine with worsening smell from her nephrostomy bag.  The patient had recent exchange of her nephrostomy tube on , stated after the exchange the patient noticed "pus-like" material after the first day, then cleared up afterwards. Also endorsed one episode of vomiting today. Patient has a history of bacteremia, 3 episodes over the last year, needing IV abx. Along with PO intake the patient also receives TPN and fluid supplementation as during the week, which her family is trained in providing via a Samuel Catheter. The patient is originally from [place], [place] and is in [place] for vacation visiting her daughter. No sick contacts recently. (26 May 2018 20:08)    No sick contacts. No recent travel. No recent antibiotics.     REVIEW OF SYSTEMS  All as below unless noted otherwise  General:  Denies fever and chills. 	  Ophthalmologic: Denies any visual complaints. 	  ENMT: No throat pain.  Respiratory: No cough, sputum. No shortness of breath.   Cardiovascular: No chest pain.   Gastrointestinal: No nausea or vomiting. No abdominal pain. No diarrhea.   Genitourinary: No burning urine, no frequency. No flank pain  Musculoskeletal: No joint swelling or pain.   Neurological: No confusion. 	  Skin: No rash    PAST MEDICAL & SURGICAL HISTORY:  Colon cancer: ~ s/p resection, chemotherapy, radiation therapy  Hypothyroid  HTN (hypertension)  H/O jejunostomy  H/O nephrostomy: ~ right sided  H/O colectomy  H/O ileostomy      FAMILY HISTORY:  No pertinent family history in first degree relatives      SOCIAL HISTORY:    non smoker    no alcohol    ANTIMICROBIALS:    meropenem  IVPB 1000 every 12 hours  vancomycin  IVPB 1000 every 12 hours    OTHER MEDS:    acetaminophen   Tablet 650 milliGRAM(s) Oral every 6 hours PRN  acetaminophen   Tablet. 650 milliGRAM(s) Oral every 6 hours PRN  amLODIPine   Tablet 10 milliGRAM(s) Oral at bedtime  amylase/lipase/protease  (CREON 24,000 Units) 1 Capsule(s) Oral three times a day with meals  ARIPiprazole 2 milliGRAM(s) Oral at bedtime  ascorbic acid 500 milliGRAM(s) Oral daily  chlorhexidine 4% Liquid 1 Application(s) Topical <User Schedule>  diphenoxylate/atropine 2 Tablet(s) Oral three times a day  DULoxetine 60 milliGRAM(s) Oral two times a day  ergocalciferol 97694 Unit(s) Oral daily  ferrous    sulfate 325 milliGRAM(s) Oral daily  gabapentin 300 milliGRAM(s) Oral at bedtime  heparin  Injectable 5000 Unit(s) SubCutaneous every 8 hours  hydrALAZINE 20 milliGRAM(s) Oral two times a day  lactated ringers. 1000 milliLiter(s) IV Continuous <Continuous>  lactated ringers. 1000 milliLiter(s) IV Continuous <Continuous>  lactobacillus acidophilus 1 Tablet(s) Oral daily  levothyroxine 137 MICROGram(s) Oral daily  loperamide 2 milliGRAM(s) Oral three times a day  multivitamin 1 Tablet(s) Oral daily  pantoprazole    Tablet 40 milliGRAM(s) Oral before breakfast  sodium bicarbonate 650 milliGRAM(s) Oral two times a day  traZODone 200 milliGRAM(s) Oral at bedtime    Allergies  aspirin (Hives)  Cipro (Angioedema)  Reglan (Muscle Pain)  Rocephin (Hives)  Zosyn (Hives)      Vital Signs Last 24 Hrs  T(C): 37 (27 May 2018 05:44), Max: 37.2 (26 May 2018 21:19)  T(F): 98.6 (27 May 2018 05:44), Max: 99 (26 May 2018 21:19)  HR: 68 (27 May 2018 05:44) (66 - 79)  BP: 125/66 (27 May 2018 05:44) (125/66 - 179/102)  RR: 18 (27 May 2018 05:44) (16 - 18)  SpO2: 98% (27 May 2018 05:44) (95% - 100%)  Daily Height in cm: 160.02 (26 May 2018 21:19)        PHYSICAL EXAM:  patient in no acute respiratory distress.  Constitutional: Comfortable. Awake and alert  Eyes: PERRL EOMI. No discharge.  ENMT: No sinus tenderness.  No pharyngeal erythema.   Neck: Supple. No thyromegaly   Respiratory:  air entry bilaterally, no wheezes, rhonchi, or crackles  Cardiovascular:S1 S2 wnl, No murmurs  Gastrointestinal: Soft, no tenderness , bowel sounds present,   Genitourinary: No suprapubic tenderness. no CVA tenderness   Musculoskeletal: No joint swelling. No edema.  Vascular: peripheral pulses felt  Neurological: No grossly focal deficits  Skin: No rash   Lymph Nodes: No palpable Lymphadenopathy   Psychiatric: Affect normal  Lines:                         11.7   4.30  )-----------( 130      ( 27 May 2018 05:30 )             35.7     WBC Count: 4.30 ( @ 05:30)  WBC Count: 5.55 ( @ 16:20)        137  |  100  |  25<H>  ----------------------------<  126<H>  4.1   |  24  |  1.43<H>    Ca    8.4      27 May 2018 05:30  Phos  4.0       Mg     1.7         TPro  8.3  /  Alb  4.2  /  TBili  0.3  /  DBili  x   /  AST  45<H>  /  ALT  39<H>  /  AlkPhos  145<H>      Blood Gas Venous - Lactate: 1.3: Please note updated reference range. mmol/L (18 @ 16:20)    Urinalysis Basic - ( 26 May 2018 16:40 )    Color: YELLOW / Appearance: TURBID / S.023 / pH: 6.0  Gluc: NEGATIVE / Ketone: NEGATIVE  / Bili: NEGATIVE / Urobili: NORMAL mg/dL   Blood: MODERATE / Protein: 500 mg/dL / Nitrite: NEGATIVE   Leuk Esterase: LARGE / RBC: >50 / WBC >50   Sq Epi: x / Non Sq Epi: x / Bacteria: MOD        MICROBIOLOGY:    Culture - Urine (collected 26 May 2018 17:22)  Source: NEPHROSTOMY - RIGHT  Preliminary Report (27 May 2018 09:20):    Insufficient growth, culture re-incubated.    Blood cultures pending.    RADIOLOGY:  none new Infectious Diseases Consult note  Patient is a 52y old  Female who presents with a chief complaint of positive blood cultures (26 May 2018 20:08)    CARRINGTON MAGDALENO  MRN-[id number]  HPI:  52 female with colon cancer s/p resection/chemotherapy/ radiation () with jejunostomy, ileostomy c/b short gut syndrome, right sided nephrostomy tube, HTN, hypothyroidism presenting from home with positive blood cultures. The patient states earlier in the week the patient was having night sweats and low grade temperature of 99.1 F which she reported to her physician Also stated her ID doctor noticed an increase her BUN/Cr as well. Blood cultures were collected on the . The patient also endorsed cloudy urine with worsening smell from her nephrostomy bag. The patient had recent exchange of her nephrostomy tube on , stated after the exchange the patient noticed "pus-like" material after the first day, then cleared up afterwards. Also endorsed one episode of vomiting today. Patient has a history of bacteremia, 3 episodes over the last year, needing IV abx. Along with PO intake the patient also receives TPN and fluid supplementation as during the week, which her family is trained in providing via a Ari[employer] Catheter. The patient is originally from [place], [place] and is in [place] for vacation visiting her daughter. No sick contacts recently.     REVIEW OF SYSTEMS  All as below unless noted otherwise  General:  Denies fever and chills. 	  Ophthalmologic: Denies any visual complaints. 	  ENMT: No throat pain.  Respiratory: No cough, sputum. No shortness of breath.   Cardiovascular: No chest pain.   Gastrointestinal: No nausea or vomiting. No abdominal pain. No diarrhea.   Genitourinary: No burning urine, no frequency. No flank pain  Musculoskeletal: No joint swelling or pain.   Neurological: No confusion. 	  Skin: No rash    PAST MEDICAL & SURGICAL HISTORY:  Colon cancer: ~ s/p resection, chemotherapy, radiation therapy  Hypothyroid  HTN (hypertension)  H/O jejunostomy  H/O nephrostomy: ~ right sided  H/O colectomy  H/O ileostomy      FAMILY HISTORY:  No pertinent family history in first degree relatives      SOCIAL HISTORY:    non smoker    no alcohol    ANTIMICROBIALS:    meropenem  IVPB 1000 every 12 hours  vancomycin  IVPB 1000 every 12 hours    OTHER MEDS:    acetaminophen   Tablet 650 milliGRAM(s) Oral every 6 hours PRN  acetaminophen   Tablet. 650 milliGRAM(s) Oral every 6 hours PRN  amLODIPine   Tablet 10 milliGRAM(s) Oral at bedtime  amylase/lipase/protease  (CREON 24,000 Units) 1 Capsule(s) Oral three times a day with meals  ARIPiprazole 2 milliGRAM(s) Oral at bedtime  ascorbic acid 500 milliGRAM(s) Oral daily  chlorhexidine 4% Liquid 1 Application(s) Topical <User Schedule>  diphenoxylate/atropine 2 Tablet(s) Oral three times a day  DULoxetine 60 milliGRAM(s) Oral two times a day  ergocalciferol 28761 Unit(s) Oral daily  ferrous    sulfate 325 milliGRAM(s) Oral daily  gabapentin 300 milliGRAM(s) Oral at bedtime  heparin  Injectable 5000 Unit(s) SubCutaneous every 8 hours  hydrALAZINE 20 milliGRAM(s) Oral two times a day  lactated ringers. 1000 milliLiter(s) IV Continuous <Continuous>  lactated ringers. 1000 milliLiter(s) IV Continuous <Continuous>  lactobacillus acidophilus 1 Tablet(s) Oral daily  levothyroxine 137 MICROGram(s) Oral daily  loperamide 2 milliGRAM(s) Oral three times a day  multivitamin 1 Tablet(s) Oral daily  pantoprazole    Tablet 40 milliGRAM(s) Oral before breakfast  sodium bicarbonate 650 milliGRAM(s) Oral two times a day  traZODone 200 milliGRAM(s) Oral at bedtime    Allergies  aspirin (Hives)  Cipro (Angioedema)  Reglan (Muscle Pain)  Rocephin (Hives)  Zosyn (Hives)      Vital Signs Last 24 Hrs  T(C): 37 (27 May 2018 05:44), Max: 37.2 (26 May 2018 21:19)  T(F): 98.6 (27 May 2018 05:44), Max: 99 (26 May 2018 21:19)  HR: 68 (27 May 2018 05:44) (66 - 79)  BP: 125/66 (27 May 2018 05:44) (125/66 - 179/102)  RR: 18 (27 May 2018 05:44) (16 - 18)  SpO2: 98% (27 May 2018 05:44) (95% - 100%)  Daily Height in cm: 160.02 (26 May 2018 21:19)        PHYSICAL EXAM:  patient in no acute respiratory distress.  Constitutional: Comfortable. Awake and alert  Eyes: PERRL EOMI. No discharge.  ENMT: No sinus tenderness.  No pharyngeal erythema.   Neck: Supple. No thyromegaly   Respiratory:  air entry bilaterally, no wheezes, rhonchi, or crackles  Cardiovascular:S1 S2 wnl, No murmurs  Gastrointestinal: ileostomy, ileal conduit just emptied  Soft, no tenderness , bowel sounds present,   Genitourinary: right nephrostomy tube  No suprapubic tenderness. + right CVA tenderness   Musculoskeletal: No joint swelling. No edema.  Vascular: peripheral pulses felt  Neurological: No grossly focal deficits  Skin: No rash   Lymph Nodes: No palpable Lymphadenopathy   Psychiatric: Affect normal  Lines: arias catheter                         11.7   4.30  )-----------( 130      ( 27 May 2018 05:30 )             35.7     WBC Count: 4.30 ( @ 05:30)  WBC Count: 5.55 ( @ 16:20)        137  |  100  |  25<H>  ----------------------------<  126<H>  4.1   |  24  |  1.43<H>    Ca    8.4      27 May 2018 05:30  Phos  4.0       Mg     1.7         TPro  8.3  /  Alb  4.2  /  TBili  0.3  /  DBili  x   /  AST  45<H>  /  ALT  39<H>  /  AlkPhos  145<H>      Blood Gas Venous - Lactate: 1.3: Please note updated reference range. mmol/L (18 @ 16:20)    Urinalysis Basic - ( 26 May 2018 16:40 )    Color: YELLOW / Appearance: TURBID / S.023 / pH: 6.0  Gluc: NEGATIVE / Ketone: NEGATIVE  / Bili: NEGATIVE / Urobili: NORMAL mg/dL   Blood: MODERATE / Protein: 500 mg/dL / Nitrite: NEGATIVE   Leuk Esterase: LARGE / RBC: >50 / WBC >50   Sq Epi: x / Non Sq Epi: x / Bacteria: MOD        MICROBIOLOGY:    Culture - Urine (collected 26 May 2018 17:22)  Source: NEPHROSTOMY - RIGHT  Preliminary Report (27 May 2018 09:20):    Insufficient growth, culture re-incubated.    Blood cultures pending.    RADIOLOGY:  none new Infectious Diseases Consult note  Patient is a 52y old  Female who presents with a chief complaint of positive blood cultures (26 May 2018 20:08)    CARRINGTON MAGDALENO  MRN-[id number]  HPI:  52 female with colon cancer s/p resection/chemotherapy/ radiation () with jejunostomy, ileostomy c/b short gut syndrome, right sided nephrostomy tube, HTN, hypothyroidism presenting from home with positive blood cultures. The patient states earlier in the week the patient was having night sweats and low grade temperature of 99.1 F which she reported to her physician Also stated her ID doctor noticed an increase her BUN/Cr as well. Blood cultures were collected on the  and are now reportedly growing GNR and G+C. The patient also endorsed cloudy urine with worsening smell from her nephrostomy bag. The patient had recent exchange of her nephrostomy tube on , stated after the exchange the patient noticed "pus-like" material after the first day, then cleared up afterwards. Also endorsed one episode of vomiting today. Patient has a history of bacteremia, 3 episodes over the last year, needing IV abx. Along with PO intake the patient also receives TPN and fluid supplementation as during the week, which her family is trained in providing via a Ari[employer] Catheter. The patient is originally from [place], [place] and is in [place] for vacation visiting her daughter. No sick contacts recently.   Three weeks ago, she completed a course of IV Vancomycin for blood stream infection that she was told came from her urine.  She states she has had multiple episodes of blood stream infections this year.    REVIEW OF SYSTEMS  All as below unless noted otherwise  General:  Denies fever and chills. 	  Ophthalmologic: Denies any visual complaints. 	  ENMT: No throat pain.  Respiratory: No cough, sputum. No shortness of breath.   Cardiovascular: No chest pain.   Gastrointestinal: No nausea or vomiting. No abdominal pain. No diarrhea.   Genitourinary: No burning urine, no frequency. No flank pain  Musculoskeletal: No joint swelling or pain.   Neurological: No confusion. 	  Skin: No rash    PAST MEDICAL & SURGICAL HISTORY:  Colon cancer: ~ s/p resection, chemotherapy, radiation therapy   Hypothyroid  HTN (hypertension)  H/O jejunostomy  H/O nephrostomy: ~ right sided  H/O colectomy  H/O ileostomy      FAMILY HISTORY:  No pertinent family history in first degree relatives      SOCIAL HISTORY:    non smoker    no alcohol  lives in [place] - visiting her daughter    ANTIMICROBIALS:    meropenem  IVPB 1000 every 12 hours  vancomycin  IVPB 1000 every 12 hours    OTHER MEDS:    acetaminophen   Tablet 650 milliGRAM(s) Oral every 6 hours PRN  acetaminophen   Tablet. 650 milliGRAM(s) Oral every 6 hours PRN  amLODIPine   Tablet 10 milliGRAM(s) Oral at bedtime  amylase/lipase/protease  (CREON 24,000 Units) 1 Capsule(s) Oral three times a day with meals  ARIPiprazole 2 milliGRAM(s) Oral at bedtime  ascorbic acid 500 milliGRAM(s) Oral daily  chlorhexidine 4% Liquid 1 Application(s) Topical <User Schedule>  diphenoxylate/atropine 2 Tablet(s) Oral three times a day  DULoxetine 60 milliGRAM(s) Oral two times a day  ergocalciferol 52544 Unit(s) Oral daily  ferrous    sulfate 325 milliGRAM(s) Oral daily  gabapentin 300 milliGRAM(s) Oral at bedtime  heparin  Injectable 5000 Unit(s) SubCutaneous every 8 hours  hydrALAZINE 20 milliGRAM(s) Oral two times a day  lactated ringers. 1000 milliLiter(s) IV Continuous <Continuous>  lactated ringers. 1000 milliLiter(s) IV Continuous <Continuous>  lactobacillus acidophilus 1 Tablet(s) Oral daily  levothyroxine 137 MICROGram(s) Oral daily  loperamide 2 milliGRAM(s) Oral three times a day  multivitamin 1 Tablet(s) Oral daily  pantoprazole    Tablet 40 milliGRAM(s) Oral before breakfast  sodium bicarbonate 650 milliGRAM(s) Oral two times a day  traZODone 200 milliGRAM(s) Oral at bedtime    Allergies  aspirin (Hives)  Cipro (Angioedema)  Reglan (Muscle Pain)  Rocephin (Hives)  Zosyn (Hives)      Vital Signs Last 24 Hrs  T(C): 37 (27 May 2018 05:44), Max: 37.2 (26 May 2018 21:19)  T(F): 98.6 (27 May 2018 05:44), Max: 99 (26 May 2018 21:19)  HR: 68 (27 May 2018 05:44) (66 - 79)  BP: 125/66 (27 May 2018 05:44) (125/66 - 179/102)  RR: 18 (27 May 2018 05:44) (16 - 18)  SpO2: 98% (27 May 2018 05:44) (95% - 100%)  Daily Height in cm: 160.02 (26 May 2018 21:19)        PHYSICAL EXAM:  patient in no acute respiratory distress.  Constitutional: Comfortable. Awake and alert  Eyes: PERRL EOMI. No discharge.  ENMT: No sinus tenderness.  No pharyngeal erythema.   Neck: Supple. No thyromegaly   Respiratory:  air entry bilaterally, no wheezes, rhonchi, or crackles  Cardiovascular:S1 S2 wnl, No murmurs  Gastrointestinal: ileostomy, ileal conduit just emptied  Soft, no tenderness , bowel sounds present,   Genitourinary: right nephrostomy tube  No suprapubic tenderness. mild  right CVA tenderness   Musculoskeletal: No joint swelling. No edema.  Vascular: peripheral pulses felt  Neurological: No grossly focal deficits  Skin: No rash   Lymph Nodes: No palpable Lymphadenopathy   Psychiatric: Affect normal  Lines: arias catheter site clear                        11.7   4.30  )-----------( 130      ( 27 May 2018 05:30 )             35.7     WBC Count: 4.30 ( @ 05:30)  WBC Count: 5.55 ( @ 16:20)        137  |  100  |  25<H>  ----------------------------<  126<H>  4.1   |  24  |  1.43<H>    Creatinine: 1.43 ( @ 05:30)    Creatinine: 1.41 ( @ 16:20)        Ca    8.4      27 May 2018 05:30  Phos  4.0       Mg     1.7         TPro  8.3  /  Alb  4.2  /  TBili  0.3  /  DBili  x   /  AST  45<H>  /  ALT  39<H>  /  AlkPhos  145<H>      Blood Gas Venous - Lactate: 1.3:  (18 @ 16:20)    Urinalysis Basic - ( 26 May 2018 16:40 )    Color: YELLOW / Appearance: TURBID / S.023 / pH: 6.0  Gluc: NEGATIVE / Ketone: NEGATIVE  / Bili: NEGATIVE / Urobili: NORMAL mg/dL   Blood: MODERATE / Protein: 500 mg/dL / Nitrite: NEGATIVE   Leuk Esterase: LARGE / RBC: >50 / WBC >50   Sq Epi: x / Non Sq Epi: x / Bacteria: MOD    MICROBIOLOGY:  Culture - Urine (collected 26 May 2018 17:22)  Source: NEPHROSTOMY - RIGHT  Preliminary Report (27 May 2018 09:20):    Insufficient growth, culture re-incubated.    Blood cultures pending.    RADIOLOGY:  none

## 2018-05-27 NOTE — PROGRESS NOTE ADULT - PROBLEM SELECTOR PLAN 7
- heparin SQ  - Diet: Regular.   - Dispo: Home - heparin SQ  - Diet: Regular.   - Dispo: Home  -[facility] for [facility]: [phone]  Micro lab [facility]: [phone]  [facility]: [phone]

## 2018-05-27 NOTE — PROVIDER CONTACT NOTE (OTHER) - ACTION/TREATMENT ORDERED:
As per dr. garcía pt's dosage of hydralazine to be changed to 3 times a day and dosage to be given now.

## 2018-05-27 NOTE — CONSULT NOTE ADULT - SUBJECTIVE AND OBJECTIVE BOX
51 yo female with a history of colon cancer s/p resection/chemo/ radiation () with jejunostomy, ileostomy c/b short gut syndrome, right sided nephrostomy tube, HTN, hypothyroidism and ileal conduit with right NT for unclear indication. Patient presented to hospital following positive blood cultures drawn by primary PCP for elevated temperatures. Patient also noted one episode of vomiting today. Patient has a history of bacteremia, 3 episodes over the last year, needing IV abx. Patient  denies flank pain or pain from NT. NT draining clear yellow urine. Pt had NT exchanged on  by IR at [facility] and gets it exchanged regularly.    PAST MEDICAL & SURGICAL HISTORY:  Colon cancer: ~ s/p resection, chemotherapy, radiation therapy  Hypothyroid  HTN (hypertension)  H/O jejunostomy  H/O nephrostomy: ~ right sided  H/O colectomy  H/O ileostomy    MEDICATIONS  (STANDING):  amLODIPine   Tablet 10 milliGRAM(s) Oral at bedtime  amylase/lipase/protease  (CREON 24,000 Units) 1 Capsule(s) Oral three times a day with meals  ARIPiprazole 2 milliGRAM(s) Oral at bedtime  ascorbic acid 500 milliGRAM(s) Oral daily  chlorhexidine 4% Liquid 1 Application(s) Topical <User Schedule>  diphenoxylate/atropine 2 Tablet(s) Oral three times a day  DULoxetine 60 milliGRAM(s) Oral two times a day  ergocalciferol 16192 Unit(s) Oral daily  ferrous    sulfate 325 milliGRAM(s) Oral daily  gabapentin 300 milliGRAM(s) Oral at bedtime  heparin  Injectable 5000 Unit(s) SubCutaneous every 8 hours  hydrALAZINE 20 milliGRAM(s) Oral three times a day  lactated ringers. 1000 milliLiter(s) (100 mL/Hr) IV Continuous <Continuous>  lactated ringers. 1000 milliLiter(s) (100 mL/Hr) IV Continuous <Continuous>  lactobacillus acidophilus 1 Tablet(s) Oral daily  levothyroxine 137 MICROGram(s) Oral daily  loperamide 2 milliGRAM(s) Oral three times a day  meropenem  IVPB 1000 milliGRAM(s) IV Intermittent every 12 hours  multivitamin 1 Tablet(s) Oral daily  pantoprazole    Tablet 40 milliGRAM(s) Oral before breakfast  sodium bicarbonate 650 milliGRAM(s) Oral two times a day  traZODone 200 milliGRAM(s) Oral at bedtime  vancomycin  IVPB 1000 milliGRAM(s) IV Intermittent every 12 hours    MEDICATIONS  (PRN):  acetaminophen   Tablet 650 milliGRAM(s) Oral every 6 hours PRN For Temp greater than 38 C (100.4 F)  acetaminophen   Tablet. 650 milliGRAM(s) Oral every 6 hours PRN Mild and Moderate Pain    FAMILY HISTORY:  No pertinent family history in first degree relatives    Allergies    aspirin (Hives)  Cipro (Angioedema)  Reglan (Muscle Pain)  Rocephin (Hives)  Zosyn (Hives)    Intolerances      SOCIAL HISTORY:   Tobacco hx:    REVIEW OF SYSTEMS: Pertinent positives and negatives as stated in HPI, otherwise negative    Vital signs  T(C): 36.7, Max: 37.2 ( @ 21:19)  HR: 85  BP: 177/107  SpO2: 99%    Output    18 @ 07:01  -  18 @ 15:40  --------------------------------------------------------  IN: 1000 mL / OUT: 1100 mL / NET: -100 mL      UOP    Physical Exam  Gen: NA  Abd: Soft, NT, ND  : NT draining clear yellow urine. Urostomy draining     LABS:                                            11.7                  Neurophils% (auto):   70.7   ( @ 05:30):    4.30 )-----------(130          Lymphocytes% (auto):  10.9                                          35.7                   Eosinphils% (auto):   5.1      Manual%: Neutrophils x    ; Lymphocytes x    ; Eosinophils x    ; Bands%: x    ; Blasts x              137  |  100  |  25<H>  ----------------------------<  126<H>  4.1   |  24  |  1.43<H>    Ca    8.4      27 May 2018 05:30  Phos  4.0       Mg     1.7         TPro  8.3  /  Alb  4.2  /  TBili  0.3  /  DBili  x   /  AST  45<H>  /  ALT  39<H>  /  AlkPhos  145<H>          VBG - ( 26 May 2018 16:20 )  pH: 7.37  /  pCO2: 50    /  pO2: 35    / HCO3: 25    / Base Excess: 2.7   /  SvO2: 60.8  / Lactate: 1.3      RECENT CULTURES:   @ 17:22 NEPHROSTOMY - RIGHT                    @ 05:30    WBC 4.30  / Hct 35.7  / SCr 1.43      @ 16:20    WBC 5.55  / Hct 40.6  / SCr 1.41         137  |  100  |  25<H>  ----------------------------<  126<H>  4.1   |  24  |  1.43<H>    Ca    8.4      27 May 2018 05:30  Phos  4.0       Mg     1.7         TPro  8.3  /  Alb  4.2  /  TBili  0.3  /  DBili  x   /  AST  45<H>  /  ALT  39<H>  /  AlkPhos  145<H>        Urinalysis Basic - ( 26 May 2018 16:40 )    Color: YELLOW / Appearance: TURBID / S.023 / pH: 6.0  Gluc: NEGATIVE / Ketone: NEGATIVE  / Bili: NEGATIVE / Urobili: NORMAL mg/dL   Blood: MODERATE / Protein: 500 mg/dL / Nitrite: NEGATIVE   Leuk Esterase: LARGE / RBC: >50 / WBC >50   Sq Epi: x / Non Sq Epi: x / Bacteria: MOD          Culture - Urine (collected 18 @ 17:22)  Source: NEPHROSTOMY - RIGHT  Preliminary Report (18 @ 09:20):    Insufficient growth, culture re-incubated.        Urine Cx:   Blood Cx:    RADIOLOGY:

## 2018-05-27 NOTE — CONSULT NOTE ADULT - ASSESSMENT
Pt is a 51 yo female with bacteremia and history of colon cancer with mutliple surgical co-morbities.  - Obtain medical records from [place]  - Follow up NT culture and urine culture.  - No urgent indication for NT exchange at this time given recent exchange.
52 female with colon cancer s/p resection/chemotherapy/ radiation (2007) with jejunostomy, ileostomy c/b short gut syndrome with a Samuel catheter on TPN, right sided nephrostomy tube, HTN, hypothyroidism presenting from home with positive blood cultures.     Bacteremia  concern for urinary source  awaiting identification of organism   awaiting sensitivities of organism    f/u Repeat blood cultures  continue vancomycin  monitor vancomycin trough- get level prior to 4th dose  continue meropenem

## 2018-05-27 NOTE — PROGRESS NOTE ADULT - PROBLEM SELECTOR PLAN 2
- Pt endorses foul smelling urine, cloudy urine and pus from the nephrostomy tube following exchange   - UA grossly positive with pyuria and large LE  - c/w Deep and vanco  - f/u on blood and urine cultures - Pt endorses foul smelling urine, cloudy urine and pus from the nephrostomy tube following exchange   - UA grossly positive with pyuria and large LE  - c/w Deep and vanco  - f/u on blood and urine cultures  -spoke to , will not advocate for tube exchange unless culture grows organism

## 2018-05-27 NOTE — PROGRESS NOTE ADULT - PROBLEM SELECTOR PLAN 1
- Patient reported to have positive blood cultures with GPC and GNR as an outpatient   - Pt has nonspecific symptoms (night sweats, low grade temperature)  - possible sources includes UTI vs catheter line infection   - Most likely source is UTI given the turbid urine pyuria and large LE on the UA   - would cover broadly for now, given pt's history of recurrent bacteremia needing IV abx  - c/w Deep and Vancomycin   - f/u Blood and urine cultures   -f/u speciation and sensitivity  - IVF hydration - Patient reported to have positive blood cultures with GPC and GNR as an outpatient   - Pt has nonspecific symptoms (night sweats, low grade temperature)  - possible sources includes UTI vs catheter line infection   - Most likely source is UTI given the turbid urine pyuria and large LE on the UA   - would cover broadly for now, given pt's history of recurrent bacteremia needing IV abx  - c/w Deep and Vancomycin   - f/u Blood and urine cultures   -f/u speciation and sensitivity  -ID consulted regarding holding TPN and possibility of switching to PO  - IVF hydration - Patient reported to have positive blood cultures with GPC and GNR as an outpatient   - Pt has nonspecific symptoms (night sweats, low grade temperature)  - possible sources includes UTI vs catheter line infection   - Most likely source is UTI given the turbid urine pyuria and large LE on the UA   - would cover broadly for now, given pt's history of recurrent bacteremia needing IV abx  - c/w Deep and Vancomycin   - f/u Blood and urine cultures   -f/u speciation and sensitivity  -ID consulted regarding holding TPN and possibility of switching to PO abx on dc  - IVF hydration - Patient reported to have positive blood cultures with GPC and GNR as an outpatient   - Pt has nonspecific symptoms (night sweats, low grade temperature)  - possible sources includes UTI vs catheter line infection   - Most likely source is UTI given the turbid urine pyuria and large LE on the UA   - would cover broadly for now, given pt's history of recurrent bacteremia needing IV abx  - c/w Deep and Vancomycin   - f/u Blood and urine cultures   -f/u speciation and sensitivity  -ID consulted, waiting final recs but rec Samuel removal and holding TPN for now  - IVF hydration

## 2018-05-27 NOTE — PROGRESS NOTE ADULT - PROBLEM SELECTOR PLAN 3
- Current Cr is 1.41  - unknown baseline   -s/p  IVF LR at 100 mL/Hr x 15 hours (especially since patient also receives supplemental fluids via Samuel catheter ~ twice weekly)  - would continue to monitor - Current Cr is 1.41  - unknown baseline   -s/p  IVF LR at 100 mL/Hr x 15 hours (especially since patient also receives supplemental fluids via Samuel catheter ~ twice weekly)  -c/w IVF  - would continue to monitor

## 2018-05-27 NOTE — PROGRESS NOTE ADULT - PROBLEM SELECTOR PLAN 4
- due to multiple surgeries from colon cancer, ileostomy and jejunostomy   - Informed patient to bring in TPN supplies when needed. Receives lipids on Fridays, and supplemental nutrition MW Saturday  - c/w Loperamide and lomotil  - continues on vitamin supplements  - Hgb no sign of iron deficiency (Hgb = 13.4, although may be mildly dehydrated) - due to multiple surgeries from colon cancer, ileostomy and jejunostomy   - Informed patient to bring in TPN supplies when needed. Receives lipids on Fridays, and supplemental nutrition MW Saturday, however will hold off restarting based on ID recs.  - c/w Loperamide and lomotil  - continues on vitamin supplements  - Hgb no sign of iron deficiency (Hgb = 13.4, although may be mildly dehydrated) - due to multiple surgeries from colon cancer, ileostomy and jejunostomy   - Informed patient to bring in TPN supplies when needed. Receives lipids on Fridays, and supplemental nutrition MW Saturday, however will hold off restarting based on ID recs.  -TPN recipe confirmed with pharmacy, in chart  - c/w Loperamide and lomotil  - continues on vitamin supplements  - Hgb no sign of iron deficiency (Hgb = 13.4, although may be mildly dehydrated)

## 2018-05-27 NOTE — CONSULT NOTE ADULT - ATTENDING COMMENTS
52 female with colon cancer s/p resection/chemotherapy/ radiation (2007) with jejunostomy, ileostomy c/b short gut syndrome with a Samuel catheter on TPN, right sided nephrostomy tube, HTN, hypothyroidism.   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    I called her ID physician Dr Watson at [phone] and left message.  She is tolerating meropenem    Suggest:  Hold TPN  Remove Samuel and culture tip  Monitor repeat culture results  Obtain culture results from her physicians in [place]  continue Vanco/Meropenem 5/26 -->

## 2018-05-27 NOTE — PROGRESS NOTE ADULT - SUBJECTIVE AND OBJECTIVE BOX
Patient is a 52y old  Female who presents with a chief complaint of positive blood cultures (26 May 2018 20:08)      Mike Gipson PGY1 [facility] pager [phone]      SUBJECTIVE / OVERNIGHT EVENTS: No ON events, patient is tearful and anxious on ROS, upset that she had to come into the hospital while she is visiting her daughter.  Denies any current fevers or chills, chest pain, SOB, n/v abdominal pain.  Endorses R flank discomfort which is not new for her.  Prior to coming into the hospital she does endorse feelings of malaise and episodes of dry heaving in the morning, as well as subjective fevers and night sweats.  Tolerating PO diet currently.      MEDICATIONS  (STANDING):  amLODIPine   Tablet 10 milliGRAM(s) Oral at bedtime  amylase/lipase/protease  (CREON 24,000 Units) 1 Capsule(s) Oral three times a day with meals  ARIPiprazole 2 milliGRAM(s) Oral at bedtime  ascorbic acid 500 milliGRAM(s) Oral daily  chlorhexidine 4% Liquid 1 Application(s) Topical <User Schedule>  diphenoxylate/atropine 2 Tablet(s) Oral three times a day  DULoxetine 60 milliGRAM(s) Oral two times a day  ergocalciferol 42397 Unit(s) Oral daily  ferrous    sulfate 325 milliGRAM(s) Oral daily  gabapentin 300 milliGRAM(s) Oral at bedtime  heparin  Injectable 5000 Unit(s) SubCutaneous every 8 hours  hydrALAZINE 20 milliGRAM(s) Oral two times a day  lactated ringers. 1000 milliLiter(s) (100 mL/Hr) IV Continuous <Continuous>  lactobacillus acidophilus 1 Tablet(s) Oral daily  levothyroxine 137 MICROGram(s) Oral daily  loperamide 2 milliGRAM(s) Oral three times a day  meropenem  IVPB 1000 milliGRAM(s) IV Intermittent every 12 hours  multivitamin 1 Tablet(s) Oral daily  pantoprazole    Tablet 40 milliGRAM(s) Oral before breakfast  sodium bicarbonate 650 milliGRAM(s) Oral two times a day  traZODone 200 milliGRAM(s) Oral at bedtime  vancomycin  IVPB 1000 milliGRAM(s) IV Intermittent every 12 hours    MEDICATIONS  (PRN):  acetaminophen   Tablet 650 milliGRAM(s) Oral every 6 hours PRN For Temp greater than 38 C (100.4 F)  acetaminophen   Tablet. 650 milliGRAM(s) Oral every 6 hours PRN Mild and Moderate Pain      T(C): 37 (18 @ 05:44), Max: 37.2 (18 @ 21:19)  HR: 68 (18 @ 05:44) (66 - 79)  BP: 125/66 (18 @ 05:44) (125/66 - 179/102)  RR: 18 (18 @ 05:44) (16 - 18)  SpO2: 98% (18 @ 05:44) (95% - 100%)    PHYSICAL EXAM  GENERAL: NAD, obese  NEURO: AO x3, PERRLA, EOMI, motor strength in tact in 4/4 extremities, sensation in tact  HEAD:  Atraumatic, Normocephalic  EYES: conjunctiva and sclera clear  NECK: Supple, No JVD, no lymphadenopathy, no thyromegaly  CHEST/LUNG: Clear to auscultation bilaterally; No wheezes, rales or rhonchi, Samuel line in R chest wall cdi  HEART: Regular rate and rhythm; No murmurs, rubs, or gallops  ABDOMEN: Soft, Nontender, Nondistended; Bowel sounds present, no masses, jejunostomy in place full of brown stool, ileal conduit in place with bag full of yellow urine, R sided nephrostomy tube in place, no swelling or erythema.  EXTREMITIES:  2+ Peripheral Pulses, No clubbing, cyanosis, or edema  SKIN: Warm, dry, in tact, no rashes or lesions  PSYCH: affect appropriate    LABS:                        11.7   4.30  )-----------( 130      ( 27 May 2018 05:30 )             35.7         137  |  100  |  25<H>  ----------------------------<  126<H>  4.1   |  24  |  1.43<H>    Ca    8.4      27 May 2018 05:30  Phos  4.0       Mg     1.7         TPro  8.3  /  Alb  4.2  /  TBili  0.3  /  DBili  x   /  AST  45<H>  /  ALT  39<H>  /  AlkPhos  145<H>            Urinalysis Basic - ( 26 May 2018 16:40 )    Color: YELLOW / Appearance: TURBID / S.023 / pH: 6.0  Gluc: NEGATIVE / Ketone: NEGATIVE  / Bili: NEGATIVE / Urobili: NORMAL mg/dL   Blood: MODERATE / Protein: 500 mg/dL / Nitrite: NEGATIVE   Leuk Esterase: LARGE / RBC: >50 / WBC >50   Sq Epi: x / Non Sq Epi: x / Bacteria: MOD      I&O's Summary    27 May 2018 07:01  -  27 May 2018 10:28  --------------------------------------------------------  IN: 0 mL / OUT: 500 mL / NET: -500 mL Patient is a 52y old  Female who presents with a chief complaint of positive blood cultures (26 May 2018 20:08)      Mike Gipson PGY1 [facility] pager [phone]      SUBJECTIVE / OVERNIGHT EVENTS: No ON events, patient is tearful and anxious on ROS, upset that she had to come into the hospital while she is visiting her daughter.  Denies any current fevers or chills, chest pain, SOB, n/v abdominal pain.  Endorses R flank discomfort which is not new for her.  Prior to coming into the hospital she does endorse feelings of malaise and episodes of dry heaving in the morning, as well as subjective fevers and night sweats.  Tolerating PO diet currently.      MEDICATIONS  (STANDING):  amLODIPine   Tablet 10 milliGRAM(s) Oral at bedtime  amylase/lipase/protease  (CREON 24,000 Units) 1 Capsule(s) Oral three times a day with meals  ARIPiprazole 2 milliGRAM(s) Oral at bedtime  ascorbic acid 500 milliGRAM(s) Oral daily  chlorhexidine 4% Liquid 1 Application(s) Topical <User Schedule>  diphenoxylate/atropine 2 Tablet(s) Oral three times a day  DULoxetine 60 milliGRAM(s) Oral two times a day  ergocalciferol 46273 Unit(s) Oral daily  ferrous    sulfate 325 milliGRAM(s) Oral daily  gabapentin 300 milliGRAM(s) Oral at bedtime  heparin  Injectable 5000 Unit(s) SubCutaneous every 8 hours  hydrALAZINE 20 milliGRAM(s) Oral two times a day  lactated ringers. 1000 milliLiter(s) (100 mL/Hr) IV Continuous <Continuous>  lactobacillus acidophilus 1 Tablet(s) Oral daily  levothyroxine 137 MICROGram(s) Oral daily  loperamide 2 milliGRAM(s) Oral three times a day  meropenem  IVPB 1000 milliGRAM(s) IV Intermittent every 12 hours  multivitamin 1 Tablet(s) Oral daily  pantoprazole    Tablet 40 milliGRAM(s) Oral before breakfast  sodium bicarbonate 650 milliGRAM(s) Oral two times a day  traZODone 200 milliGRAM(s) Oral at bedtime  vancomycin  IVPB 1000 milliGRAM(s) IV Intermittent every 12 hours    MEDICATIONS  (PRN):  acetaminophen   Tablet 650 milliGRAM(s) Oral every 6 hours PRN For Temp greater than 38 C (100.4 F)  acetaminophen   Tablet. 650 milliGRAM(s) Oral every 6 hours PRN Mild and Moderate Pain      T(C): 37 (18 @ 05:44), Max: 37.2 (18 @ 21:19)  HR: 68 (18 @ 05:44) (66 - 79)  BP: 125/66 (18 @ 05:44) (125/66 - 179/102)  RR: 18 (18 @ 05:44) (16 - 18)  SpO2: 98% (18 @ 05:44) (95% - 100%)    PHYSICAL EXAM  GENERAL: NAD, obese  NEURO: AO x3, PERRLA, EOMI, motor strength in tact in 4/4 extremities, sensation in tact  HEAD:  Atraumatic, Normocephalic  EYES: conjunctiva and sclera clear  NECK: Supple, No JVD, no lymphadenopathy, no thyromegaly  CHEST/LUNG: Clear to auscultation bilaterally; No wheezes, rales or rhonchi, Samuel line in R chest wall cdi  HEART: Regular rate and rhythm; No murmurs, rubs, or gallops  ABDOMEN: Soft, Nontender, Nondistended; Bowel sounds present, no masses, jejunostomy in place full of brown stool, ileal conduit in place with bag full of yellow urine, R sided nephrostomy tube in place, no swelling or erythema.  EXTREMITIES:  2+ Peripheral Pulses, No clubbing, cyanosis, or edema  SKIN: Warm, dry, in tact, no rashes or lesions  PSYCH: tearful    LABS:                        11.7   4.30  )-----------( 130      ( 27 May 2018 05:30 )             35.7         137  |  100  |  25<H>  ----------------------------<  126<H>  4.1   |  24  |  1.43<H>    Ca    8.4      27 May 2018 05:30  Phos  4.0       Mg     1.7         TPro  8.3  /  Alb  4.2  /  TBili  0.3  /  DBili  x   /  AST  45<H>  /  ALT  39<H>  /  AlkPhos  145<H>            Urinalysis Basic - ( 26 May 2018 16:40 )    Color: YELLOW / Appearance: TURBID / S.023 / pH: 6.0  Gluc: NEGATIVE / Ketone: NEGATIVE  / Bili: NEGATIVE / Urobili: NORMAL mg/dL   Blood: MODERATE / Protein: 500 mg/dL / Nitrite: NEGATIVE   Leuk Esterase: LARGE / RBC: >50 / WBC >50   Sq Epi: x / Non Sq Epi: x / Bacteria: MOD      I&O's Summary    27 May 2018 07:01  -  27 May 2018 10:28  --------------------------------------------------------  IN: 0 mL / OUT: 500 mL / NET: -500 mL

## 2018-05-28 LAB
ALBUMIN SERPL ELPH-MCNC: 3.6 G/DL — SIGNIFICANT CHANGE UP (ref 3.3–5)
ALP SERPL-CCNC: 138 U/L — HIGH (ref 40–120)
ALT FLD-CCNC: 41 U/L — HIGH (ref 4–33)
AST SERPL-CCNC: 42 U/L — HIGH (ref 4–32)
BASOPHILS # BLD AUTO: 0.03 K/UL — SIGNIFICANT CHANGE UP (ref 0–0.2)
BASOPHILS NFR BLD AUTO: 0.8 % — SIGNIFICANT CHANGE UP (ref 0–2)
BILIRUB SERPL-MCNC: 0.4 MG/DL — SIGNIFICANT CHANGE UP (ref 0.2–1.2)
BUN SERPL-MCNC: 20 MG/DL — SIGNIFICANT CHANGE UP (ref 7–23)
CALCIUM SERPL-MCNC: 8.8 MG/DL — SIGNIFICANT CHANGE UP (ref 8.4–10.5)
CHLORIDE SERPL-SCNC: 99 MMOL/L — SIGNIFICANT CHANGE UP (ref 98–107)
CO2 SERPL-SCNC: 20 MMOL/L — LOW (ref 22–31)
CREAT SERPL-MCNC: 1.28 MG/DL — SIGNIFICANT CHANGE UP (ref 0.5–1.3)
EOSINOPHIL # BLD AUTO: 0.21 K/UL — SIGNIFICANT CHANGE UP (ref 0–0.5)
EOSINOPHIL NFR BLD AUTO: 5.3 % — SIGNIFICANT CHANGE UP (ref 0–6)
GLUCOSE SERPL-MCNC: 113 MG/DL — HIGH (ref 70–99)
HCT VFR BLD CALC: 36.9 % — SIGNIFICANT CHANGE UP (ref 34.5–45)
HGB BLD-MCNC: 12.2 G/DL — SIGNIFICANT CHANGE UP (ref 11.5–15.5)
IMM GRANULOCYTES # BLD AUTO: 0.01 # — SIGNIFICANT CHANGE UP
IMM GRANULOCYTES NFR BLD AUTO: 0.3 % — SIGNIFICANT CHANGE UP (ref 0–1.5)
LYMPHOCYTES # BLD AUTO: 0.44 K/UL — LOW (ref 1–3.3)
LYMPHOCYTES # BLD AUTO: 11 % — LOW (ref 13–44)
MAGNESIUM SERPL-MCNC: 1.5 MG/DL — LOW (ref 1.6–2.6)
MCHC RBC-ENTMCNC: 31.1 PG — SIGNIFICANT CHANGE UP (ref 27–34)
MCHC RBC-ENTMCNC: 33.1 % — SIGNIFICANT CHANGE UP (ref 32–36)
MCV RBC AUTO: 94.1 FL — SIGNIFICANT CHANGE UP (ref 80–100)
MONOCYTES # BLD AUTO: 0.52 K/UL — SIGNIFICANT CHANGE UP (ref 0–0.9)
MONOCYTES NFR BLD AUTO: 13 % — SIGNIFICANT CHANGE UP (ref 2–14)
NEUTROPHILS # BLD AUTO: 2.78 K/UL — SIGNIFICANT CHANGE UP (ref 1.8–7.4)
NEUTROPHILS NFR BLD AUTO: 69.6 % — SIGNIFICANT CHANGE UP (ref 43–77)
NRBC # FLD: 0 — SIGNIFICANT CHANGE UP
PHOSPHATE SERPL-MCNC: 3.3 MG/DL — SIGNIFICANT CHANGE UP (ref 2.5–4.5)
PLATELET # BLD AUTO: 126 K/UL — LOW (ref 150–400)
PMV BLD: 10.3 FL — SIGNIFICANT CHANGE UP (ref 7–13)
POTASSIUM SERPL-MCNC: 3.9 MMOL/L — SIGNIFICANT CHANGE UP (ref 3.5–5.3)
POTASSIUM SERPL-SCNC: 3.9 MMOL/L — SIGNIFICANT CHANGE UP (ref 3.5–5.3)
PROT SERPL-MCNC: 7.3 G/DL — SIGNIFICANT CHANGE UP (ref 6–8.3)
RBC # BLD: 3.92 M/UL — SIGNIFICANT CHANGE UP (ref 3.8–5.2)
RBC # FLD: 15 % — HIGH (ref 10.3–14.5)
SODIUM SERPL-SCNC: 136 MMOL/L — SIGNIFICANT CHANGE UP (ref 135–145)
VANCOMYCIN TROUGH SERPL-MCNC: 15.3 UG/ML — SIGNIFICANT CHANGE UP (ref 10–20)
WBC # BLD: 3.99 K/UL — SIGNIFICANT CHANGE UP (ref 3.8–10.5)
WBC # FLD AUTO: 3.99 K/UL — SIGNIFICANT CHANGE UP (ref 3.8–10.5)

## 2018-05-28 PROCEDURE — 99233 SBSQ HOSP IP/OBS HIGH 50: CPT | Mod: GC

## 2018-05-28 RX ORDER — ACETAMINOPHEN 500 MG
650 TABLET ORAL ONCE
Qty: 0 | Refills: 0 | Status: COMPLETED | OUTPATIENT
Start: 2018-05-28 | End: 2018-05-28

## 2018-05-28 RX ORDER — DIPHENHYDRAMINE HCL 50 MG
25 CAPSULE ORAL ONCE
Qty: 0 | Refills: 0 | Status: COMPLETED | OUTPATIENT
Start: 2018-05-28 | End: 2018-05-28

## 2018-05-28 RX ORDER — HYDRALAZINE HCL 50 MG
10 TABLET ORAL THREE TIMES A DAY
Qty: 0 | Refills: 0 | Status: DISCONTINUED | OUTPATIENT
Start: 2018-05-28 | End: 2018-05-28

## 2018-05-28 RX ORDER — HYDRALAZINE HCL 50 MG
20 TABLET ORAL THREE TIMES A DAY
Qty: 0 | Refills: 0 | Status: DISCONTINUED | OUTPATIENT
Start: 2018-05-28 | End: 2018-05-28

## 2018-05-28 RX ORDER — HYDROMORPHONE HYDROCHLORIDE 2 MG/ML
0.25 INJECTION INTRAMUSCULAR; INTRAVENOUS; SUBCUTANEOUS ONCE
Qty: 0 | Refills: 0 | Status: DISCONTINUED | OUTPATIENT
Start: 2018-05-28 | End: 2018-05-28

## 2018-05-28 RX ORDER — HYDRALAZINE HCL 50 MG
30 TABLET ORAL THREE TIMES A DAY
Qty: 0 | Refills: 0 | Status: DISCONTINUED | OUTPATIENT
Start: 2018-05-28 | End: 2018-05-31

## 2018-05-28 RX ADMIN — Medication 137 MICROGRAM(S): at 07:01

## 2018-05-28 RX ADMIN — Medication 325 MILLIGRAM(S): at 14:26

## 2018-05-28 RX ADMIN — Medication 1 TABLET(S): at 14:26

## 2018-05-28 RX ADMIN — Medication 650 MILLIGRAM(S): at 19:50

## 2018-05-28 RX ADMIN — HYDROMORPHONE HYDROCHLORIDE 0.25 MILLIGRAM(S): 2 INJECTION INTRAMUSCULAR; INTRAVENOUS; SUBCUTANEOUS at 01:01

## 2018-05-28 RX ADMIN — Medication 2 TABLET(S): at 14:25

## 2018-05-28 RX ADMIN — AMLODIPINE BESYLATE 10 MILLIGRAM(S): 2.5 TABLET ORAL at 22:25

## 2018-05-28 RX ADMIN — Medication 500 MILLIGRAM(S): at 14:26

## 2018-05-28 RX ADMIN — Medication 30 MILLIGRAM(S): at 14:26

## 2018-05-28 RX ADMIN — PANTOPRAZOLE SODIUM 40 MILLIGRAM(S): 20 TABLET, DELAYED RELEASE ORAL at 07:02

## 2018-05-28 RX ADMIN — Medication 250 MILLIGRAM(S): at 10:12

## 2018-05-28 RX ADMIN — Medication 2 TABLET(S): at 07:08

## 2018-05-28 RX ADMIN — DULOXETINE HYDROCHLORIDE 60 MILLIGRAM(S): 30 CAPSULE, DELAYED RELEASE ORAL at 17:55

## 2018-05-28 RX ADMIN — DULOXETINE HYDROCHLORIDE 60 MILLIGRAM(S): 30 CAPSULE, DELAYED RELEASE ORAL at 07:01

## 2018-05-28 RX ADMIN — Medication 650 MILLIGRAM(S): at 07:02

## 2018-05-28 RX ADMIN — Medication 2 MILLIGRAM(S): at 07:02

## 2018-05-28 RX ADMIN — Medication 2 MILLIGRAM(S): at 22:25

## 2018-05-28 RX ADMIN — Medication 25 MILLIGRAM(S): at 23:03

## 2018-05-28 RX ADMIN — Medication 200 MILLIGRAM(S): at 22:24

## 2018-05-28 RX ADMIN — MEROPENEM 100 MILLIGRAM(S): 1 INJECTION INTRAVENOUS at 05:34

## 2018-05-28 RX ADMIN — Medication 250 MILLIGRAM(S): at 22:25

## 2018-05-28 RX ADMIN — Medication 2 TABLET(S): at 22:24

## 2018-05-28 RX ADMIN — GABAPENTIN 300 MILLIGRAM(S): 400 CAPSULE ORAL at 22:24

## 2018-05-28 RX ADMIN — Medication 20 MILLIGRAM(S): at 07:01

## 2018-05-28 RX ADMIN — Medication 30 MILLIGRAM(S): at 22:25

## 2018-05-28 RX ADMIN — HYDROMORPHONE HYDROCHLORIDE 0.25 MILLIGRAM(S): 2 INJECTION INTRAMUSCULAR; INTRAVENOUS; SUBCUTANEOUS at 01:33

## 2018-05-28 RX ADMIN — Medication 650 MILLIGRAM(S): at 20:36

## 2018-05-28 RX ADMIN — Medication 2 MILLIGRAM(S): at 14:26

## 2018-05-28 RX ADMIN — MEROPENEM 100 MILLIGRAM(S): 1 INJECTION INTRAVENOUS at 17:55

## 2018-05-28 RX ADMIN — Medication 650 MILLIGRAM(S): at 17:55

## 2018-05-28 RX ADMIN — ARIPIPRAZOLE 2 MILLIGRAM(S): 15 TABLET ORAL at 22:24

## 2018-05-28 RX ADMIN — CHLORHEXIDINE GLUCONATE 1 APPLICATION(S): 213 SOLUTION TOPICAL at 07:04

## 2018-05-28 NOTE — PROGRESS NOTE ADULT - PROBLEM SELECTOR PLAN 7
- heparin SQ  - Diet: Regular.   - Dispo: Home  -[facility] for [facility]: [phone]  Micro lab [facility]: [phone]  [facility]: [phone]

## 2018-05-28 NOTE — PROGRESS NOTE ADULT - ASSESSMENT
Pt is a 51 yo female with bacteremia and history of colon cancer with mutliple surgical co-morbities.  - Obtain medical records from [place]  - Follow up NT culture and urine culture.  - No urgent indication for NT exchange at this time given recent exchange.

## 2018-05-28 NOTE — PROGRESS NOTE ADULT - ASSESSMENT
52 female with colon cancer s/p resection/chemotherapy/ radiation (2007) with jejunostomy, ileostomy c/b short gut syndrome with a Samuel catheter on TPN, right sided nephrostomy tube, HTN, hypothyroidism.   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    I called her ID physician Dr Watson at [phone] and left message. I have not received call back.  Ms Hammonds provided contact number of lab: [phone] =  general number for [facility] - discussed with Patient Services: no record of any labs since 3/19  She is tolerating meropenem; Vancomycin level in desired range; non toxic appearance    Suggest:  Hold TPN  Remove Samuel and culture tip  i prefer that NT be exchanged as she is bacteremic after the last exchange was performed  Obtain culture results from her physicians in [place]  continue Vanco/Meropenem 5/26 -->

## 2018-05-28 NOTE — PROGRESS NOTE ADULT - PROBLEM SELECTOR PLAN 2
- Pt endorses foul smelling urine, cloudy urine and pus from the nephrostomy tube following exchange   - UA grossly positive with pyuria and large LE  - c/w Deep and vanco  - f/u on blood and urine cultures  -spoke to , will not advocate for tube exchange unless culture grows organism - Pt endorses foul smelling urine, cloudy urine and pus from the nephrostomy tube following exchange   - UA grossly positive with pyuria and large LE  - c/w Deep and vanco  - f/u on blood and urine cultures  -f/u uro recs

## 2018-05-28 NOTE — PROGRESS NOTE ADULT - SUBJECTIVE AND OBJECTIVE BOX
Patient is a 52y old  Female who presents with a chief complaint of positive blood cultures (26 May 2018 20:08)      Mike Gipson PGY1 [facility] pager [phone]      SUBJECTIVE / OVERNIGHT EVENTS: ON patient complaining of pain, thinks she may have slept on her side wrong.  Received one dose Dilaudid.  Denies any F/c/N/V/D/Cp, when asked about SOB states she has sleep apnea and will tell her daughter to bring in the machine.        MEDICATIONS  (STANDING):  amLODIPine   Tablet 10 milliGRAM(s) Oral at bedtime  amylase/lipase/protease  (CREON 24,000 Units) 1 Capsule(s) Oral three times a day with meals  ARIPiprazole 2 milliGRAM(s) Oral at bedtime  ascorbic acid 500 milliGRAM(s) Oral daily  chlorhexidine 4% Liquid 1 Application(s) Topical <User Schedule>  diphenoxylate/atropine 2 Tablet(s) Oral three times a day  DULoxetine 60 milliGRAM(s) Oral two times a day  ergocalciferol 63086 Unit(s) Oral daily  ferrous    sulfate 325 milliGRAM(s) Oral daily  gabapentin 300 milliGRAM(s) Oral at bedtime  heparin  Injectable 5000 Unit(s) SubCutaneous every 8 hours  hydrALAZINE 20 milliGRAM(s) Oral three times a day  lactated ringers. 1000 milliLiter(s) (100 mL/Hr) IV Continuous <Continuous>  lactated ringers. 1000 milliLiter(s) (100 mL/Hr) IV Continuous <Continuous>  lactobacillus acidophilus 1 Tablet(s) Oral daily  levothyroxine 137 MICROGram(s) Oral daily  loperamide 2 milliGRAM(s) Oral three times a day  meropenem  IVPB 1000 milliGRAM(s) IV Intermittent every 12 hours  multivitamin 1 Tablet(s) Oral daily  pantoprazole    Tablet 40 milliGRAM(s) Oral before breakfast  sodium bicarbonate 650 milliGRAM(s) Oral two times a day  traZODone 200 milliGRAM(s) Oral at bedtime  vancomycin  IVPB 1000 milliGRAM(s) IV Intermittent every 12 hours    MEDICATIONS  (PRN):  acetaminophen   Tablet 650 milliGRAM(s) Oral every 6 hours PRN For Temp greater than 38 C (100.4 F)  acetaminophen   Tablet. 650 milliGRAM(s) Oral every 6 hours PRN Mild and Moderate Pain      T(C): 36.7 (18 @ 04:04), Max: 36.7 (18 @ 13:53)  HR: 97 (18 @ 04:04) (81 - 97)  BP: 161/100 (18 @ 04:04) (161/100 - 177/107)  RR: 19 (18 @ 04:04) (19 - 19)  SpO2: 98% (18 @ 04:04) (95% - 99%)    PHYSICAL EXAM  GENERAL: NAD,obese  NEURO: AO x3, PERRLA, EOMI, motor strength in tact in 4/4 extremities, sensation in tact  HEAD:  Atraumatic, Normocephalic  EYES: conjunctiva and sclera clear  NECK: Supple, No JVD,   CHEST/LUNG: Clear to auscultation bilaterally; No wheezes, rales or rhonchi, Hichman in place, no erythema drainage or fluctuance, CDI  HEART: Regular rate and rhythm; No murmurs, rubs, or gallops  ABDOMEN: Soft, Nontender, Nondistended; Bowel sounds present, no masses.  Jejunostomy bag in place draining brown stool, ileostomy draining urine from ileal conduit, clear yellow  EXTREMITIES:  2+ Peripheral Pulses, No clubbing, cyanosis, or edema  SKIN: Warm, dry, in tact, no rashes or lesions, R nephrostomy tube in place draining clear yellow urine  PSYCH: affect appropriate    LABS:                        12.2   3.99  )-----------( 126      ( 28 May 2018 08:58 )             36.9         136  |  99  |  20  ----------------------------<  113<H>  3.9   |  20<L>  |  1.28    Ca    8.8      28 May 2018 08:58  Phos  3.3       Mg     1.5         TPro  7.3  /  Alb  3.6  /  TBili  0.4  /  DBili  x   /  AST  42<H>  /  ALT  41<H>  /  AlkPhos  138<H>            Urinalysis Basic - ( 26 May 2018 16:40 )    Color: YELLOW / Appearance: TURBID / S.023 / pH: 6.0  Gluc: NEGATIVE / Ketone: NEGATIVE  / Bili: NEGATIVE / Urobili: NORMAL mg/dL   Blood: MODERATE / Protein: 500 mg/dL / Nitrite: NEGATIVE   Leuk Esterase: LARGE / RBC: >50 / WBC >50   Sq Epi: x / Non Sq Epi: x / Bacteria: MOD      I&O's Summary    27 May 2018 07:01  -  28 May 2018 07:00  --------------------------------------------------------  IN: 1000 mL / OUT: 1450 mL / NET: -450 mL

## 2018-05-28 NOTE — PROGRESS NOTE ADULT - PROBLEM SELECTOR PLAN 1
- Patient reported to have positive blood cultures with GPC and GNR as an outpatient   - Pt has nonspecific symptoms on admission (night sweats, low grade temperature)  - possible sources includes UTI vs catheter line infection   - Most likely source is UTI given the turbid urine pyuria and large LE on the UA   -c/w Deep and Vancomycin   - f/u Blood and urine cultures   -f/u speciation and sensitivity  -ID consulted, rec Samuel removal and holding TPN for now  - IVF hydration - Patient reported to have positive blood cultures with GPC and GNR as an outpatient   - Pt has nonspecific symptoms on admission (night sweats, low grade temperature)  - possible sources includes UTI vs catheter line infection   - Most likely source is UTI given the turbid urine pyuria and large LE on the UA   -c/w Deep and Vancomycin   - f/u Blood and urine cultures   -patient growing Klebsiella oxytoca and Enterococcus casseliflavus, sensitive to current regimen from blood cultures drawn 5/25 in [place].  Report and sensitivities in physical chart  -ID consulted, rec Samuel removal and holding TPN for now  - IVF hydration

## 2018-05-28 NOTE — PROGRESS NOTE ADULT - PROBLEM SELECTOR PLAN 4
- due to multiple surgeries from colon cancer, ileostomy and jejunostomy   - Informed patient to bring in TPN supplies when needed. Receives lipids on Fridays, and supplemental nutrition MW Saturday, however will hold off restarting based on ID recs.  -TPN recipe confirmed with pharmacy, in chart  - c/w Loperamide and lomotil  - continues on vitamin supplements  - Hgb no sign of iron deficiency (Hgb = 13.4, although may be mildly dehydrated)

## 2018-05-28 NOTE — PROGRESS NOTE ADULT - SUBJECTIVE AND OBJECTIVE BOX
Follow Up:      Interval History/ROS:  pleasant, casual about her circumstances --- no complaints - Had severe colic pain when she briefly sat on her Nephrostomy bag - no urine came out of her ileal conduit    Allergies  aspirin (Hives)  Cipro (Angioedema)  Reglan (Muscle Pain)  Rocephin (Hives)  Zosyn (Hives)    ANTIMICROBIALS:  meropenem  IVPB 1000 every 12 hours  vancomycin  IVPB 1000 every 12 hours    OTHER MEDS:  MEDICATIONS  (STANDING):  acetaminophen   Tablet 650 every 6 hours PRN  acetaminophen   Tablet. 650 every 6 hours PRN  amLODIPine   Tablet 10 at bedtime  amylase/lipase/protease  (CREON 24,000 Units) 1 three times a day with meals  ARIPiprazole 2 at bedtime  diphenoxylate/atropine 2 three times a day  DULoxetine 60 two times a day  gabapentin 300 at bedtime  heparin  Injectable 5000 every 8 hours  hydrALAZINE 20 three times a day  levothyroxine 137 daily  loperamide 2 three times a day  pantoprazole    Tablet 40 before breakfast  traZODone 200 at bedtime    Vital Signs Last 24 Hrs  T(C): 36.7 (28 May 2018 04:04), Max: 36.7 (27 May 2018 13:53)  T(F): 98.1 (28 May 2018 04:04), Max: 98.1 (27 May 2018 21:17)  HR: 97 (28 May 2018 04:04) (81 - 97)  BP: 161/100 (28 May 2018 04:04) (161/100 - 177/107)  BP(mean): --  RR: 19 (28 May 2018 04:04) (19 - 19)  SpO2: 98% (28 May 2018 04:04) (95% - 99%)    PHYSICAL EXAM:  General: WN/WD NAD, Non-toxic  Neurology: A&Ox3, nonfocal  Respiratory: Clear to auscultation bilaterally  CV: RRR, S1S2, no murmurs, rubs or gallops  Abdominal: Soft, Non-tender, non-distended, normal bowel sounds  clear urine in nephrostomy bag  Extremities: No edema,   Line Sites: Clear right subclavian site  Skin: No rash                        12.2   3.99  )-----------( 126      ( 28 May 2018 08:58 )             36.9     05-    136  |  99  |  20  ----------------------------<  113<H>  3.9   |  20<L>  |  1.28    Ca    8.8      28 May 2018 08:58  Phos  3.3       Mg     1.5         TPro  7.3  /  Alb  3.6  /  TBili  0.4  /  DBili  x   /  AST  42<H>  /  ALT  41<H>  /  AlkPhos  138<H>        Urinalysis Basic - ( 26 May 2018 16:40 )  Color: YELLOW / Appearance: TURBID / S.023 / pH: 6.0  Gluc: NEGATIVE / Ketone: NEGATIVE  / Bili: NEGATIVE / Urobili: NORMAL mg/dL   Blood: MODERATE / Protein: 500 mg/dL / Nitrite: NEGATIVE   Leuk Esterase: LARGE / RBC: >50 / WBC >50   Sq Epi: x / Non Sq Epi: x / Bacteria: MOD    MICROBIOLOGY:  NEPHROSTOMY - RIGHT  18 --  --  --  Culture - Urine (18 @ 17:22)    Culture - Urine:   Insufficient growth, culture re-incubated.    Culture - Urine:   GNRID^Gram Neg Ken To Be Identified  COLONY COUNT: > = 100,000 CFU/ML    Specimen Source: NEPHROSTOMY - RIGHT      BLOOD  18 --  --  --    Vancomycin Level, Trough: 15.3 ug/mL (18 @ 08:58)          RADIOLOGY:    Reji López MD; Division of Infectious Disease; Pager: [phone]; nights and weekends: [phone]

## 2018-05-28 NOTE — PROGRESS NOTE ADULT - SUBJECTIVE AND OBJECTIVE BOX
Subjective no issues overnight. Remains afebrile. No c/o pain currently, however, pt sat on bag causing pain from nephrostomy.     Objective    Vital signs  T(F): , Max: 98.1 (05-27-18 @ 21:17)  HR: 97 (05-28-18 @ 04:04)  BP: 161/100 (05-28-18 @ 04:04)  SpO2: 98% (05-28-18 @ 04:04)  Wt(kg): --    Output     05-27 @ 07:01  -  05-28 @ 07:00  --------------------------------------------------------  IN: 1000 mL / OUT: 1450 mL / NET: -450 mL        Gen NAD  Abd Soft. NT   Nephrostomy draining yellow urine. Nephrostomy site clean without purulent drainage or erythema. Site non-tender.     Labs      05-28 @ 08:58    WBC 3.99  / Hct 36.9  / SCr 1.28     05-27 @ 05:30    WBC 4.30  / Hct 35.7  / SCr 1.43       Urine Cx: pending

## 2018-05-29 LAB
-  AMIKACIN: SIGNIFICANT CHANGE UP
-  AMPICILLIN/SULBACTAM: SIGNIFICANT CHANGE UP
-  AMPICILLIN: SIGNIFICANT CHANGE UP
-  AZTREONAM: SIGNIFICANT CHANGE UP
-  CEFAZOLIN: SIGNIFICANT CHANGE UP
-  CEFEPIME: SIGNIFICANT CHANGE UP
-  CEFOXITIN: SIGNIFICANT CHANGE UP
-  CEFTAZIDIME: SIGNIFICANT CHANGE UP
-  CEFTRIAXONE: SIGNIFICANT CHANGE UP
-  CIPROFLOXACIN: SIGNIFICANT CHANGE UP
-  ERTAPENEM: SIGNIFICANT CHANGE UP
-  GENTAMICIN: SIGNIFICANT CHANGE UP
-  IMIPENEM: SIGNIFICANT CHANGE UP
-  LEVOFLOXACIN: SIGNIFICANT CHANGE UP
-  MEROPENEM: SIGNIFICANT CHANGE UP
-  NITROFURANTOIN: SIGNIFICANT CHANGE UP
-  PIPERACILLIN/TAZOBACTAM: SIGNIFICANT CHANGE UP
-  TIGECYCLINE: SIGNIFICANT CHANGE UP
-  TOBRAMYCIN: SIGNIFICANT CHANGE UP
-  TRIMETHOPRIM/SULFAMETHOXAZOLE: SIGNIFICANT CHANGE UP
ALBUMIN SERPL ELPH-MCNC: 3.5 G/DL — SIGNIFICANT CHANGE UP (ref 3.3–5)
ALBUMIN SERPL ELPH-MCNC: 3.8 G/DL — SIGNIFICANT CHANGE UP (ref 3.3–5)
ALP SERPL-CCNC: 134 U/L — HIGH (ref 40–120)
ALP SERPL-CCNC: 142 U/L — HIGH (ref 40–120)
ALT FLD-CCNC: 45 U/L — HIGH (ref 4–33)
ALT FLD-CCNC: 49 U/L — HIGH (ref 4–33)
APTT BLD: 31 SEC — SIGNIFICANT CHANGE UP (ref 27.5–37.4)
AST SERPL-CCNC: 44 U/L — HIGH (ref 4–32)
AST SERPL-CCNC: 50 U/L — HIGH (ref 4–32)
BACTERIA UR CULT: SIGNIFICANT CHANGE UP
BASOPHILS # BLD AUTO: 0.03 K/UL — SIGNIFICANT CHANGE UP (ref 0–0.2)
BASOPHILS NFR BLD AUTO: 0.8 % — SIGNIFICANT CHANGE UP (ref 0–2)
BILIRUB SERPL-MCNC: 0.3 MG/DL — SIGNIFICANT CHANGE UP (ref 0.2–1.2)
BILIRUB SERPL-MCNC: 0.3 MG/DL — SIGNIFICANT CHANGE UP (ref 0.2–1.2)
BUN SERPL-MCNC: 19 MG/DL — SIGNIFICANT CHANGE UP (ref 7–23)
BUN SERPL-MCNC: 21 MG/DL — SIGNIFICANT CHANGE UP (ref 7–23)
CALCIUM SERPL-MCNC: 8.8 MG/DL — SIGNIFICANT CHANGE UP (ref 8.4–10.5)
CALCIUM SERPL-MCNC: 8.9 MG/DL — SIGNIFICANT CHANGE UP (ref 8.4–10.5)
CHLORIDE SERPL-SCNC: 100 MMOL/L — SIGNIFICANT CHANGE UP (ref 98–107)
CHLORIDE SERPL-SCNC: 99 MMOL/L — SIGNIFICANT CHANGE UP (ref 98–107)
CO2 SERPL-SCNC: 22 MMOL/L — SIGNIFICANT CHANGE UP (ref 22–31)
CO2 SERPL-SCNC: 24 MMOL/L — SIGNIFICANT CHANGE UP (ref 22–31)
CREAT SERPL-MCNC: 1.32 MG/DL — HIGH (ref 0.5–1.3)
CREAT SERPL-MCNC: 1.51 MG/DL — HIGH (ref 0.5–1.3)
EOSINOPHIL # BLD AUTO: 0.25 K/UL — SIGNIFICANT CHANGE UP (ref 0–0.5)
EOSINOPHIL NFR BLD AUTO: 6.9 % — HIGH (ref 0–6)
GLUCOSE SERPL-MCNC: 112 MG/DL — HIGH (ref 70–99)
GLUCOSE SERPL-MCNC: 120 MG/DL — HIGH (ref 70–99)
HCT VFR BLD CALC: 35.4 % — SIGNIFICANT CHANGE UP (ref 34.5–45)
HGB BLD-MCNC: 11.7 G/DL — SIGNIFICANT CHANGE UP (ref 11.5–15.5)
IMM GRANULOCYTES # BLD AUTO: 0 # — SIGNIFICANT CHANGE UP
IMM GRANULOCYTES NFR BLD AUTO: 0 % — SIGNIFICANT CHANGE UP (ref 0–1.5)
INR BLD: 1.04 — SIGNIFICANT CHANGE UP (ref 0.88–1.17)
LYMPHOCYTES # BLD AUTO: 0.66 K/UL — LOW (ref 1–3.3)
LYMPHOCYTES # BLD AUTO: 18.1 % — SIGNIFICANT CHANGE UP (ref 13–44)
MAGNESIUM SERPL-MCNC: 1.5 MG/DL — LOW (ref 1.6–2.6)
MAGNESIUM SERPL-MCNC: 1.7 MG/DL — SIGNIFICANT CHANGE UP (ref 1.6–2.6)
MCHC RBC-ENTMCNC: 30.9 PG — SIGNIFICANT CHANGE UP (ref 27–34)
MCHC RBC-ENTMCNC: 33.1 % — SIGNIFICANT CHANGE UP (ref 32–36)
MCV RBC AUTO: 93.4 FL — SIGNIFICANT CHANGE UP (ref 80–100)
METHOD TYPE: SIGNIFICANT CHANGE UP
MONOCYTES # BLD AUTO: 0.66 K/UL — SIGNIFICANT CHANGE UP (ref 0–0.9)
MONOCYTES NFR BLD AUTO: 18.1 % — HIGH (ref 2–14)
NEUTROPHILS # BLD AUTO: 2.04 K/UL — SIGNIFICANT CHANGE UP (ref 1.8–7.4)
NEUTROPHILS NFR BLD AUTO: 56.1 % — SIGNIFICANT CHANGE UP (ref 43–77)
NRBC # FLD: 0 — SIGNIFICANT CHANGE UP
ORGANISM # SPEC MICROSCOPIC CNT: SIGNIFICANT CHANGE UP
ORGANISM # SPEC MICROSCOPIC CNT: SIGNIFICANT CHANGE UP
PHOSPHATE SERPL-MCNC: 2.6 MG/DL — SIGNIFICANT CHANGE UP (ref 2.5–4.5)
PHOSPHATE SERPL-MCNC: 3.8 MG/DL — SIGNIFICANT CHANGE UP (ref 2.5–4.5)
PLATELET # BLD AUTO: 130 K/UL — LOW (ref 150–400)
PMV BLD: 10 FL — SIGNIFICANT CHANGE UP (ref 7–13)
POTASSIUM SERPL-MCNC: 3.8 MMOL/L — SIGNIFICANT CHANGE UP (ref 3.5–5.3)
POTASSIUM SERPL-MCNC: 3.9 MMOL/L — SIGNIFICANT CHANGE UP (ref 3.5–5.3)
POTASSIUM SERPL-SCNC: 3.8 MMOL/L — SIGNIFICANT CHANGE UP (ref 3.5–5.3)
POTASSIUM SERPL-SCNC: 3.9 MMOL/L — SIGNIFICANT CHANGE UP (ref 3.5–5.3)
PROT SERPL-MCNC: 7 G/DL — SIGNIFICANT CHANGE UP (ref 6–8.3)
PROT SERPL-MCNC: 7.4 G/DL — SIGNIFICANT CHANGE UP (ref 6–8.3)
PROTHROM AB SERPL-ACNC: 12 SEC — SIGNIFICANT CHANGE UP (ref 9.8–13.1)
RBC # BLD: 3.79 M/UL — LOW (ref 3.8–5.2)
RBC # FLD: 15.1 % — HIGH (ref 10.3–14.5)
SODIUM SERPL-SCNC: 138 MMOL/L — SIGNIFICANT CHANGE UP (ref 135–145)
SODIUM SERPL-SCNC: 139 MMOL/L — SIGNIFICANT CHANGE UP (ref 135–145)
VANCOMYCIN TROUGH SERPL-MCNC: 23.8 UG/ML — HIGH (ref 10–20)
WBC # BLD: 3.64 K/UL — LOW (ref 3.8–10.5)
WBC # FLD AUTO: 3.64 K/UL — LOW (ref 3.8–10.5)

## 2018-05-29 PROCEDURE — 99233 SBSQ HOSP IP/OBS HIGH 50: CPT | Mod: GC

## 2018-05-29 RX ORDER — ACETAMINOPHEN 500 MG
650 TABLET ORAL ONCE
Qty: 0 | Refills: 0 | Status: COMPLETED | OUTPATIENT
Start: 2018-05-29 | End: 2018-05-29

## 2018-05-29 RX ORDER — SODIUM CHLORIDE 9 MG/ML
1000 INJECTION, SOLUTION INTRAVENOUS
Qty: 0 | Refills: 0 | Status: DISCONTINUED | OUTPATIENT
Start: 2018-05-29 | End: 2018-05-30

## 2018-05-29 RX ORDER — ACETAMINOPHEN 500 MG
1000 TABLET ORAL ONCE
Qty: 0 | Refills: 0 | Status: DISCONTINUED | OUTPATIENT
Start: 2018-05-29 | End: 2018-05-29

## 2018-05-29 RX ORDER — MAGNESIUM SULFATE 500 MG/ML
1 VIAL (ML) INJECTION ONCE
Qty: 0 | Refills: 0 | Status: COMPLETED | OUTPATIENT
Start: 2018-05-29 | End: 2018-05-29

## 2018-05-29 RX ORDER — SODIUM CHLORIDE 9 MG/ML
1000 INJECTION, SOLUTION INTRAVENOUS
Qty: 0 | Refills: 0 | Status: COMPLETED | OUTPATIENT
Start: 2018-05-29 | End: 2018-05-29

## 2018-05-29 RX ADMIN — ARIPIPRAZOLE 2 MILLIGRAM(S): 15 TABLET ORAL at 21:59

## 2018-05-29 RX ADMIN — Medication 650 MILLIGRAM(S): at 23:00

## 2018-05-29 RX ADMIN — Medication 2 MILLIGRAM(S): at 21:59

## 2018-05-29 RX ADMIN — PANTOPRAZOLE SODIUM 40 MILLIGRAM(S): 20 TABLET, DELAYED RELEASE ORAL at 05:54

## 2018-05-29 RX ADMIN — Medication 137 MICROGRAM(S): at 05:54

## 2018-05-29 RX ADMIN — MEROPENEM 100 MILLIGRAM(S): 1 INJECTION INTRAVENOUS at 05:54

## 2018-05-29 RX ADMIN — Medication 30 MILLIGRAM(S): at 21:59

## 2018-05-29 RX ADMIN — Medication 650 MILLIGRAM(S): at 17:50

## 2018-05-29 RX ADMIN — GABAPENTIN 300 MILLIGRAM(S): 400 CAPSULE ORAL at 21:59

## 2018-05-29 RX ADMIN — Medication 30 MILLIGRAM(S): at 13:47

## 2018-05-29 RX ADMIN — Medication 650 MILLIGRAM(S): at 22:17

## 2018-05-29 RX ADMIN — Medication 2 TABLET(S): at 21:59

## 2018-05-29 RX ADMIN — DULOXETINE HYDROCHLORIDE 60 MILLIGRAM(S): 30 CAPSULE, DELAYED RELEASE ORAL at 05:54

## 2018-05-29 RX ADMIN — Medication 2 MILLIGRAM(S): at 13:47

## 2018-05-29 RX ADMIN — Medication 500 MILLIGRAM(S): at 11:54

## 2018-05-29 RX ADMIN — Medication 1 TABLET(S): at 11:54

## 2018-05-29 RX ADMIN — Medication 250 MILLIGRAM(S): at 11:52

## 2018-05-29 RX ADMIN — SODIUM CHLORIDE 100 MILLILITER(S): 9 INJECTION, SOLUTION INTRAVENOUS at 22:18

## 2018-05-29 RX ADMIN — Medication 2 TABLET(S): at 13:47

## 2018-05-29 RX ADMIN — Medication 650 MILLIGRAM(S): at 18:35

## 2018-05-29 RX ADMIN — DULOXETINE HYDROCHLORIDE 60 MILLIGRAM(S): 30 CAPSULE, DELAYED RELEASE ORAL at 17:50

## 2018-05-29 RX ADMIN — Medication 2 MILLIGRAM(S): at 05:54

## 2018-05-29 RX ADMIN — Medication 2 TABLET(S): at 05:54

## 2018-05-29 RX ADMIN — Medication 30 MILLIGRAM(S): at 05:55

## 2018-05-29 RX ADMIN — SODIUM CHLORIDE 100 MILLILITER(S): 9 INJECTION, SOLUTION INTRAVENOUS at 19:27

## 2018-05-29 RX ADMIN — Medication 100 GRAM(S): at 07:44

## 2018-05-29 RX ADMIN — Medication 650 MILLIGRAM(S): at 05:54

## 2018-05-29 RX ADMIN — MEROPENEM 100 MILLIGRAM(S): 1 INJECTION INTRAVENOUS at 17:50

## 2018-05-29 RX ADMIN — SODIUM CHLORIDE 100 MILLILITER(S): 9 INJECTION, SOLUTION INTRAVENOUS at 19:26

## 2018-05-29 RX ADMIN — Medication 200 MILLIGRAM(S): at 21:59

## 2018-05-29 RX ADMIN — Medication 325 MILLIGRAM(S): at 11:54

## 2018-05-29 RX ADMIN — CHLORHEXIDINE GLUCONATE 1 APPLICATION(S): 213 SOLUTION TOPICAL at 05:55

## 2018-05-29 RX ADMIN — AMLODIPINE BESYLATE 10 MILLIGRAM(S): 2.5 TABLET ORAL at 21:59

## 2018-05-29 NOTE — PROGRESS NOTE ADULT - SUBJECTIVE AND OBJECTIVE BOX
Follow Up:      Interval History/ROS: feels ok, ambulating  - tearful as she describes her health issues. She states its been difficult for her daughter. She expressed great appreciation for her team of doctors.    Allergies  aspirin (Hives)  Cipro (Angioedema)  Reglan (Muscle Pain)  Rocephin (Hives)  Zosyn (Hives)    ANTIMICROBIALS:  meropenem  IVPB 1000 every 12 hours  vancomycin  IVPB 1000 every 12 hours      OTHER MEDS:  MEDICATIONS  (STANDING):  amLODIPine   Tablet 10 at bedtime  ARIPiprazole 2 at bedtime  diphenoxylate/atropine 2 three times a day  DULoxetine 60 two times a day  gabapentin 300 at bedtime  heparin  Injectable 5000 every 8 hours  hydrALAZINE 30 three times a day  levothyroxine 137 daily  loperamide 2 three times a day  pantoprazole    Tablet 40 before breakfast  traZODone 200 at bedtime      Vital Signs Last 24 Hrs  T(C): 36.6 (29 May 2018 20:43), Max: 36.9 (28 May 2018 21:40)  T(F): 97.8 (29 May 2018 20:43), Max: 98.4 (28 May 2018 21:40)  HR: 88 (29 May 2018 20:43) (87 - 91)  BP: 171/94 (29 May 2018 20:43) (149/100 - 171/94)  BP(mean): --  RR: 18 (29 May 2018 20:43) (18 - 18)  SpO2: 97% (29 May 2018 20:43) (95% - 99%)    PHYSICAL EXAM:  General: WN/WD NAD, Non-toxic  Neurology: A&Ox3, nonfocal  Respiratory: no resp distress  CV: RRR, S1S2, no murmurs, rubs or gallops  Abdominal:  mildly distended,   Extremities: No edema,   Line Sites: Clear  Skin: No rash                        11.7   3.64  )-----------( 130      ( 29 May 2018 06:05 )             35.4       05-29    138  |  100  |  19  ----------------------------<  112<H>  3.9   |  22  |  1.32<H>    Ca    8.9      29 May 2018 15:15  Phos  2.6     05-29  Mg     1.7     05-29    TPro  7.4  /  Alb  3.8  /  TBili  0.3  /  DBili  x   /  AST  50<H>  /  ALT  49<H>  /  AlkPhos  142<H>  05-29      MICROBIOLOGY:  NEPHROSTOMY - RIGHT  05-26-18 --  --  Klebsiella oxytoca      BLOOD  05-26-18 --  --  --      RADIOLOGY:      Reji López MD; Division of Infectious Disease; Pager: [phone]; nights and weekends: [phone]

## 2018-05-29 NOTE — PROGRESS NOTE ADULT - PROBLEM SELECTOR PLAN 4
- due to multiple surgeries from colon cancer, ileostomy and jejunostomy   - Informed patient to bring in TPN supplies when needed. Receives lipids on Fridays, and supplemental nutrition MW Saturday, however will hold off restarting based on ID recs.  -TPN recipe confirmed with pharmacy, in chart  - c/w Loperamide and lomotil  - continues on vitamin supplements  - Hgb no sign of iron deficiency (Hgb = 13.4, although may be mildly dehydrated) - due to multiple surgeries from colon cancer, ileostomy and jejunostomy   - Informed patient to bring in TPN supplies when needed. Receives lipids on Fridays, and supplemental nutrition MW Saturday, however will hold off restarting based on ID recs.  -TPN recipe confirmed with pharmacy, in chart  - c/w Loperamide and lomotil  - continues on vitamin supplements  - Hgb no sign of iron deficiency (Hgb was13.4, although may be mildly dehydrated)

## 2018-05-29 NOTE — PROGRESS NOTE ADULT - PROBLEM SELECTOR PLAN 7
- heparin SQ  - Diet: Regular.   - Dispo: Home  -[facility]: [phone]  Micro lab [facility]: [phone]  [facility]: [phone]

## 2018-05-29 NOTE — PROGRESS NOTE ADULT - ASSESSMENT
52 y.o. female with colon cancer s/p resection/chemo/ radiation (2007) with jejunostomy, ileostomy c/b short gut syndrome, right sided nephrostomy tube, HTN, hypothyroidism admitted for bacteremia possible due to UTI. 52 y.o. female with colon cancer s/p resection/chemo/ radiation (2007) with jejunostomy, ileostomy c/b short gut syndrome, right sided nephrostomy tube, HTN, hypothyroidism admitted for bacteremia, sources include urine or hichman catheter

## 2018-05-29 NOTE — PROGRESS NOTE ADULT - PROBLEM SELECTOR PLAN 1
- Patient reported to have positive blood cultures with GPC and GNR as an outpatient   - Pt has nonspecific symptoms on admission (night sweats, low grade temperature)  - possible sources includes UTI vs catheter line infection   - Most likely source is UTI given the turbid urine pyuria and large LE on the UA   -c/w Deep and Vancomycin   - f/u Blood and urine cultures   -patient growing Klebsiella oxytoca and Enterococcus casseliflavus, sensitive to current regimen from blood cultures drawn 5/25 in [place].  Report and sensitivities in physical chart  -ID consulted, rec Samuel removal and holding TPN for now  - IVF hydration - Patient reported to have positive blood cultures with GPC and GNR as an outpatient   - Pt has nonspecific symptoms on admission (night sweats, low grade temperature)  - possible sources includes UTI vs catheter line infection   - Most likely source is UTI given the turbid urine pyuria and large LE on the UA   -c/w Deep and Vancomycin   - f/u Blood and urine cultures   -patient growing Klebsiella oxytoca and Enterococcus casseliflavus, sensitive to current regimen from blood cultures drawn 5/25 in [place].  Report and sensitivities in physical chart  -ID consulted, rec Samuel removal, nephrostomy tube exchange and holding TPN for now  - IVF hydration

## 2018-05-29 NOTE — PROGRESS NOTE ADULT - ASSESSMENT
5/25/18 Blood isolates identified as Klebsiella oxytoxa and Enterococcus cassiflavus  Klebs growing from Nephrostomy drainage  she is clinically well  IR notes negative blood culture to date here and declines to remove Samuel  Urology notes recent T change on 5/22 and does not feel NT exchange needed.    The patient states she is experienced at home iv antibiotic administration  suggest:  Hold TPN until current antibiotic course completed and patient stable  Continue Meropenem/Vanco 5/26 ---> 6/2  Administer antibiotics via Samuel  I have no objection to discharge with IV antibiotics    I will be out 5/30--  ID service will see

## 2018-05-29 NOTE — PROGRESS NOTE ADULT - SUBJECTIVE AND OBJECTIVE BOX
Patient is a 52y old  Female who presents with a chief complaint of positive blood cultures (26 May 2018 20:08)      Mike Gipson PGY1 [facility] pager [phone]      SUBJECTIVE / OVERNIGHT EVENTS: No overnight events. No complaints this AM. Patient denies CP, SOB, N/V/F/C.        MEDICATIONS  (STANDING):  amLODIPine   Tablet 10 milliGRAM(s) Oral at bedtime  ARIPiprazole 2 milliGRAM(s) Oral at bedtime  ascorbic acid 500 milliGRAM(s) Oral daily  chlorhexidine 4% Liquid 1 Application(s) Topical <User Schedule>  diphenoxylate/atropine 2 Tablet(s) Oral three times a day  DULoxetine 60 milliGRAM(s) Oral two times a day  ferrous    sulfate 325 milliGRAM(s) Oral daily  gabapentin 300 milliGRAM(s) Oral at bedtime  heparin  Injectable 5000 Unit(s) SubCutaneous every 8 hours  hydrALAZINE 30 milliGRAM(s) Oral three times a day  lactated ringers. 1000 milliLiter(s) (100 mL/Hr) IV Continuous <Continuous>  lactated ringers. 1000 milliLiter(s) (100 mL/Hr) IV Continuous <Continuous>  lactated ringers. 1000 milliLiter(s) (100 mL/Hr) IV Continuous <Continuous>  lactobacillus acidophilus 1 Tablet(s) Oral daily  levothyroxine 137 MICROGram(s) Oral daily  loperamide 2 milliGRAM(s) Oral three times a day  meropenem  IVPB 1000 milliGRAM(s) IV Intermittent every 12 hours  multivitamin 1 Tablet(s) Oral daily  pantoprazole    Tablet 40 milliGRAM(s) Oral before breakfast  sodium bicarbonate 650 milliGRAM(s) Oral two times a day  traZODone 200 milliGRAM(s) Oral at bedtime  vancomycin  IVPB 1000 milliGRAM(s) IV Intermittent every 12 hours    MEDICATIONS  (PRN):      T(C): 36.6 (05-29-18 @ 05:50), Max: 37 (05-28-18 @ 14:49)  HR: 91 (05-29-18 @ 08:29) (87 - 100)  BP: 166/88 (05-29-18 @ 05:50) (151/86 - 183/111)  RR: 18 (05-29-18 @ 05:50) (16 - 18)  SpO2: 99% (05-29-18 @ 08:29) (95% - 99%)    PHYSICAL EXAM  GENERAL: NAD, well-developed  NEURO: AO x3, PERRLA, EOMI, motor strength in tact in 4/4 extremities, sensation in tact  HEAD:  Atraumatic, Normocephalic  EYES: conjunctiva and sclera clear  NECK: Supple, No JVD, no lymphadenopathy, no thyromegaly  CHEST/LUNG: Clear to auscultation bilaterally; No wheezes, rales or rhonchi  HEART: Regular rate and rhythm; No murmurs, rubs, or gallops  ABDOMEN: Soft, Nontender, Nondistended; Bowel sounds present, no masses.  EXTREMITIES:  2+ Peripheral Pulses, No clubbing, cyanosis, or edema  SKIN: Warm, dry, in tact, no rashes or lesions  PSYCH: affect appropriate    LABS:                        11.7   3.64  )-----------( 130      ( 29 May 2018 06:05 )             35.4     05-29    139  |  99  |  21  ----------------------------<  120<H>  3.8   |  24  |  1.51<H>    Ca    8.8      29 May 2018 06:05  Phos  3.8     05-29  Mg     1.5     05-29    TPro  7.0  /  Alb  3.5  /  TBili  0.3  /  DBili  x   /  AST  44<H>  /  ALT  45<H>  /  AlkPhos  134<H>  05-29            I&O's Summary    28 May 2018 07:01  -  29 May 2018 07:00  --------------------------------------------------------  IN: 300 mL / OUT: 0 mL / NET: 300 mL    29 May 2018 07:01  -  29 May 2018 10:03  --------------------------------------------------------  IN: 0 mL / OUT: 1400 mL / NET: -1400 mL Patient is a 52y old  Female who presents with a chief complaint of positive blood cultures (26 May 2018 20:08)      Mike Gipson PGY1 [facility] pager [phone]      SUBJECTIVE / OVERNIGHT EVENTS: No overnight events. No complaints this AM. Patient denies CP, SOB, N/V/F/C.        MEDICATIONS  (STANDING):  amLODIPine   Tablet 10 milliGRAM(s) Oral at bedtime  ARIPiprazole 2 milliGRAM(s) Oral at bedtime  ascorbic acid 500 milliGRAM(s) Oral daily  chlorhexidine 4% Liquid 1 Application(s) Topical <User Schedule>  diphenoxylate/atropine 2 Tablet(s) Oral three times a day  DULoxetine 60 milliGRAM(s) Oral two times a day  ferrous    sulfate 325 milliGRAM(s) Oral daily  gabapentin 300 milliGRAM(s) Oral at bedtime  heparin  Injectable 5000 Unit(s) SubCutaneous every 8 hours  hydrALAZINE 30 milliGRAM(s) Oral three times a day  lactated ringers. 1000 milliLiter(s) (100 mL/Hr) IV Continuous <Continuous>  lactated ringers. 1000 milliLiter(s) (100 mL/Hr) IV Continuous <Continuous>  lactated ringers. 1000 milliLiter(s) (100 mL/Hr) IV Continuous <Continuous>  lactobacillus acidophilus 1 Tablet(s) Oral daily  levothyroxine 137 MICROGram(s) Oral daily  loperamide 2 milliGRAM(s) Oral three times a day  meropenem  IVPB 1000 milliGRAM(s) IV Intermittent every 12 hours  multivitamin 1 Tablet(s) Oral daily  pantoprazole    Tablet 40 milliGRAM(s) Oral before breakfast  sodium bicarbonate 650 milliGRAM(s) Oral two times a day  traZODone 200 milliGRAM(s) Oral at bedtime  vancomycin  IVPB 1000 milliGRAM(s) IV Intermittent every 12 hours    MEDICATIONS  (PRN):      T(C): 36.6 (05-29-18 @ 05:50), Max: 37 (05-28-18 @ 14:49)  HR: 91 (05-29-18 @ 08:29) (87 - 100)  BP: 166/88 (05-29-18 @ 05:50) (151/86 - 183/111)  RR: 18 (05-29-18 @ 05:50) (16 - 18)  SpO2: 99% (05-29-18 @ 08:29) (95% - 99%)    PHYSICAL EXAM  GENERAL: NAD,  NEURO: AO x3, PERRLA, EOMI, motor strength in tact in 4/4 extremities, sensation in tact  HEAD:  Atraumatic, Normocephalic  EYES: conjunctiva and sclera clear  NECK: Supple, No JVD,   CHEST/LUNG: Clear to auscultation bilaterally; No wheezes, rales or rhonchi, Hichman in place, no erythema drainage or fluctuance, CDI  HEART: Regular rate and rhythm; No murmurs, rubs, or gallops  ABDOMEN: Soft, Nontender, Nondistended; Bowel sounds present, no masses.  Jejunostomy bag in place draining brown stool, ileostomy draining urine from ileal conduit, clear yellow  EXTREMITIES:  2+ Peripheral Pulses, No clubbing, cyanosis, or edema  SKIN: Warm, dry, in tact, no rashes or lesions R nephrostomy tube in place draining clear yellow urine  PSYCH: affect appropriate    LABS:                        11.7   3.64  )-----------( 130      ( 29 May 2018 06:05 )             35.4     05-29    139  |  99  |  21  ----------------------------<  120<H>  3.8   |  24  |  1.51<H>    Ca    8.8      29 May 2018 06:05  Phos  3.8     05-29  Mg     1.5     05-29    TPro  7.0  /  Alb  3.5  /  TBili  0.3  /  DBili  x   /  AST  44<H>  /  ALT  45<H>  /  AlkPhos  134<H>  05-29            I&O's Summary    28 May 2018 07:01  -  29 May 2018 07:00  --------------------------------------------------------  IN: 300 mL / OUT: 0 mL / NET: 300 mL    29 May 2018 07:01  -  29 May 2018 10:03  --------------------------------------------------------  IN: 0 mL / OUT: 1400 mL / NET: -1400 mL

## 2018-05-29 NOTE — PROGRESS NOTE ADULT - PROBLEM SELECTOR PLAN 2
- Pt endorses foul smelling urine, cloudy urine and pus from the nephrostomy tube following exchange   - UA grossly positive with pyuria and large LE  - c/w Deep and vanco  - f/u on blood and urine cultures  -f/u uro recs

## 2018-05-29 NOTE — PROGRESS NOTE ADULT - ATTENDING COMMENTS
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  ID advises to hold TPN, remove Samuel and culture tip, prefer that NT be exchanged as she is bacteremic after the last exchange was performed  continue Vanco/Meropenem   Will Dw IR re- hickmas removal and NT tube exchange

## 2018-05-29 NOTE — PROGRESS NOTE ADULT - PROBLEM SELECTOR PLAN 3
- Current Cr is 1.28, LARRY resolved  - unknown baseline   -s/p  IVF LR at 100 mL/Hr  -will CTM - Current Cr is 1.5 from 1.28,   - unknown baseline   -s/p  IVF LR at 100 mL/Hr, will reorder  -will CTM

## 2018-05-30 LAB
ALBUMIN SERPL ELPH-MCNC: 3.5 G/DL — SIGNIFICANT CHANGE UP (ref 3.3–5)
ALP SERPL-CCNC: 125 U/L — HIGH (ref 40–120)
ALT FLD-CCNC: 46 U/L — HIGH (ref 4–33)
AST SERPL-CCNC: 46 U/L — HIGH (ref 4–32)
BASOPHILS # BLD AUTO: 0.02 K/UL — SIGNIFICANT CHANGE UP (ref 0–0.2)
BASOPHILS NFR BLD AUTO: 0.4 % — SIGNIFICANT CHANGE UP (ref 0–2)
BILIRUB SERPL-MCNC: 0.3 MG/DL — SIGNIFICANT CHANGE UP (ref 0.2–1.2)
BUN SERPL-MCNC: 16 MG/DL — SIGNIFICANT CHANGE UP (ref 7–23)
CALCIUM SERPL-MCNC: 8.8 MG/DL — SIGNIFICANT CHANGE UP (ref 8.4–10.5)
CHLORIDE SERPL-SCNC: 103 MMOL/L — SIGNIFICANT CHANGE UP (ref 98–107)
CO2 SERPL-SCNC: 24 MMOL/L — SIGNIFICANT CHANGE UP (ref 22–31)
CREAT SERPL-MCNC: 1.15 MG/DL — SIGNIFICANT CHANGE UP (ref 0.5–1.3)
EOSINOPHIL # BLD AUTO: 0.19 K/UL — SIGNIFICANT CHANGE UP (ref 0–0.5)
EOSINOPHIL NFR BLD AUTO: 4.1 % — SIGNIFICANT CHANGE UP (ref 0–6)
GLUCOSE SERPL-MCNC: 104 MG/DL — HIGH (ref 70–99)
HCT VFR BLD CALC: 33.8 % — LOW (ref 34.5–45)
HGB BLD-MCNC: 11.1 G/DL — LOW (ref 11.5–15.5)
IMM GRANULOCYTES # BLD AUTO: 0.02 # — SIGNIFICANT CHANGE UP
IMM GRANULOCYTES NFR BLD AUTO: 0.4 % — SIGNIFICANT CHANGE UP (ref 0–1.5)
LYMPHOCYTES # BLD AUTO: 0.67 K/UL — LOW (ref 1–3.3)
LYMPHOCYTES # BLD AUTO: 14.6 % — SIGNIFICANT CHANGE UP (ref 13–44)
MAGNESIUM SERPL-MCNC: 1.6 MG/DL — SIGNIFICANT CHANGE UP (ref 1.6–2.6)
MCHC RBC-ENTMCNC: 29.7 PG — SIGNIFICANT CHANGE UP (ref 27–34)
MCHC RBC-ENTMCNC: 32.8 % — SIGNIFICANT CHANGE UP (ref 32–36)
MCV RBC AUTO: 90.4 FL — SIGNIFICANT CHANGE UP (ref 80–100)
MONOCYTES # BLD AUTO: 0.65 K/UL — SIGNIFICANT CHANGE UP (ref 0–0.9)
MONOCYTES NFR BLD AUTO: 14.2 % — HIGH (ref 2–14)
NEUTROPHILS # BLD AUTO: 3.03 K/UL — SIGNIFICANT CHANGE UP (ref 1.8–7.4)
NEUTROPHILS NFR BLD AUTO: 66.3 % — SIGNIFICANT CHANGE UP (ref 43–77)
NRBC # FLD: 0 — SIGNIFICANT CHANGE UP
PHOSPHATE SERPL-MCNC: 2.9 MG/DL — SIGNIFICANT CHANGE UP (ref 2.5–4.5)
PLATELET # BLD AUTO: 130 K/UL — LOW (ref 150–400)
PMV BLD: 10.1 FL — SIGNIFICANT CHANGE UP (ref 7–13)
POTASSIUM SERPL-MCNC: 4.2 MMOL/L — SIGNIFICANT CHANGE UP (ref 3.5–5.3)
POTASSIUM SERPL-SCNC: 4.2 MMOL/L — SIGNIFICANT CHANGE UP (ref 3.5–5.3)
PROT SERPL-MCNC: 6.7 G/DL — SIGNIFICANT CHANGE UP (ref 6–8.3)
RBC # BLD: 3.74 M/UL — LOW (ref 3.8–5.2)
RBC # FLD: 15.3 % — HIGH (ref 10.3–14.5)
SODIUM SERPL-SCNC: 139 MMOL/L — SIGNIFICANT CHANGE UP (ref 135–145)
SPECIMEN SOURCE: SIGNIFICANT CHANGE UP
VANCOMYCIN TROUGH SERPL-MCNC: 25.4 UG/ML — CRITICAL HIGH (ref 10–20)
VANCOMYCIN TROUGH SERPL-MCNC: < 1.3 UG/ML — LOW (ref 10–20)
WBC # BLD: 4.58 K/UL — SIGNIFICANT CHANGE UP (ref 3.8–10.5)
WBC # FLD AUTO: 4.58 K/UL — SIGNIFICANT CHANGE UP (ref 3.8–10.5)

## 2018-05-30 PROCEDURE — 99232 SBSQ HOSP IP/OBS MODERATE 35: CPT

## 2018-05-30 PROCEDURE — 99233 SBSQ HOSP IP/OBS HIGH 50: CPT | Mod: GC

## 2018-05-30 RX ORDER — VANCOMYCIN HCL 1 G
1 VIAL (EA) INTRAVENOUS
Qty: 8 | Refills: 0 | OUTPATIENT
Start: 2018-05-30 | End: 2018-06-02

## 2018-05-30 RX ORDER — MEROPENEM 1 G/30ML
1000 INJECTION INTRAVENOUS
Qty: 8000 | Refills: 0 | OUTPATIENT
Start: 2018-05-30 | End: 2018-06-02

## 2018-05-30 RX ORDER — HYDROMORPHONE HYDROCHLORIDE 2 MG/ML
0.5 INJECTION INTRAMUSCULAR; INTRAVENOUS; SUBCUTANEOUS EVERY 6 HOURS
Qty: 0 | Refills: 0 | Status: DISCONTINUED | OUTPATIENT
Start: 2018-05-30 | End: 2018-05-31

## 2018-05-30 RX ADMIN — Medication 2 TABLET(S): at 13:53

## 2018-05-30 RX ADMIN — DULOXETINE HYDROCHLORIDE 60 MILLIGRAM(S): 30 CAPSULE, DELAYED RELEASE ORAL at 17:22

## 2018-05-30 RX ADMIN — Medication 1 TABLET(S): at 13:48

## 2018-05-30 RX ADMIN — AMLODIPINE BESYLATE 10 MILLIGRAM(S): 2.5 TABLET ORAL at 21:49

## 2018-05-30 RX ADMIN — Medication 650 MILLIGRAM(S): at 17:22

## 2018-05-30 RX ADMIN — PANTOPRAZOLE SODIUM 40 MILLIGRAM(S): 20 TABLET, DELAYED RELEASE ORAL at 05:55

## 2018-05-30 RX ADMIN — ARIPIPRAZOLE 2 MILLIGRAM(S): 15 TABLET ORAL at 21:49

## 2018-05-30 RX ADMIN — DULOXETINE HYDROCHLORIDE 60 MILLIGRAM(S): 30 CAPSULE, DELAYED RELEASE ORAL at 05:55

## 2018-05-30 RX ADMIN — Medication 200 MILLIGRAM(S): at 21:49

## 2018-05-30 RX ADMIN — Medication 30 MILLIGRAM(S): at 21:49

## 2018-05-30 RX ADMIN — Medication 2 MILLIGRAM(S): at 13:47

## 2018-05-30 RX ADMIN — Medication 30 MILLIGRAM(S): at 05:55

## 2018-05-30 RX ADMIN — CHLORHEXIDINE GLUCONATE 1 APPLICATION(S): 213 SOLUTION TOPICAL at 05:55

## 2018-05-30 RX ADMIN — Medication 250 MILLIGRAM(S): at 17:22

## 2018-05-30 RX ADMIN — Medication 1 TABLET(S): at 13:47

## 2018-05-30 RX ADMIN — Medication 2 MILLIGRAM(S): at 05:55

## 2018-05-30 RX ADMIN — Medication 2 MILLIGRAM(S): at 21:49

## 2018-05-30 RX ADMIN — MEROPENEM 100 MILLIGRAM(S): 1 INJECTION INTRAVENOUS at 17:22

## 2018-05-30 RX ADMIN — Medication 30 MILLIGRAM(S): at 13:47

## 2018-05-30 RX ADMIN — Medication 2 TABLET(S): at 05:55

## 2018-05-30 RX ADMIN — Medication 137 MICROGRAM(S): at 05:55

## 2018-05-30 RX ADMIN — Medication 2 TABLET(S): at 21:49

## 2018-05-30 RX ADMIN — HYDROMORPHONE HYDROCHLORIDE 0.5 MILLIGRAM(S): 2 INJECTION INTRAMUSCULAR; INTRAVENOUS; SUBCUTANEOUS at 18:50

## 2018-05-30 RX ADMIN — Medication 325 MILLIGRAM(S): at 13:47

## 2018-05-30 RX ADMIN — Medication 500 MILLIGRAM(S): at 13:46

## 2018-05-30 RX ADMIN — HYDROMORPHONE HYDROCHLORIDE 0.5 MILLIGRAM(S): 2 INJECTION INTRAMUSCULAR; INTRAVENOUS; SUBCUTANEOUS at 17:35

## 2018-05-30 RX ADMIN — GABAPENTIN 300 MILLIGRAM(S): 400 CAPSULE ORAL at 21:49

## 2018-05-30 RX ADMIN — MEROPENEM 100 MILLIGRAM(S): 1 INJECTION INTRAVENOUS at 05:54

## 2018-05-30 RX ADMIN — Medication 650 MILLIGRAM(S): at 05:54

## 2018-05-30 NOTE — PROGRESS NOTE ADULT - ATTENDING COMMENTS
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  ID advises to hold TPN, remove Samuel and culture tip, prefer that NT be exchanged as she is bacteremic after the last exchange was performed    As per discussion between ID and IR, IR notes negative blood culture to date here and declines to remove Samuel,   Urology notes recent T change on 5/22 and does not feel NT exchange needed.    Appreciate ID recommendations- Hold TPN until current antibiotic course completed and patient stable. Continue Meropenem/Vanco 5/26 ---> 6/2  Administer antibiotics via Samuel   Check surveillance blood cultures with PMD next week after abx completed.  No need for outpt labs while on abx.     CHRIS CM- Await home Abx set up  DC planning 35 mins  Pt advsied to FU with PMD/IN within 1 week of dc Pt seen and examined by me Agree with resident  # Bacteremia-Blood cultures faxed from outpt provider in [place]  5/25 -positive for  Klebsiella oxytoca and enterococcus  BC 5/26- NGTD  UC from nephrostomy tube - Klebsiella   On Vancomycin/Meropenem  Vanco trough elevated last night, dose held.   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  ID advises to hold TPN, remove Samuel and culture tip, prefer that NT be exchanged as she is bacteremic after the last exchange was performed    As per discussion between ID and IR, IR notes negative blood culture to date here and declines to remove Samuel,   Urology notes recent T change on 5/22 and does not feel NT exchange needed.    Appreciate ID recommendations- Hold TPN until current antibiotic course completed and patient stable. Continue Meropenem/Vanco 5/26 ---> 6/2  Administer antibiotics via Samuel   Check surveillance blood cultures with PMD next week after abx completed.  No need for outpt labs while on abx.     CHRIS CM- Await home Abx set up  DC planning 35 mins  Pt advsied to FU with PMD/IN within 1 week of dc

## 2018-05-30 NOTE — PROGRESS NOTE ADULT - ASSESSMENT
5/25/18 Blood isolates identified as Klebsiella oxytoxa and Enterococcus cassiflavus  Klebs growing from Nephrostomy drainage  she is clinically well  IR notes negative blood culture to date here and declines to remove Samuel  Urology notes recent T change on 5/22 and does not feel NT exchange needed.    The patient states she is experienced at home iv antibiotic administration    suggest:  Hold TPN until current antibiotic course completed and patient stable  Continue Meropenem/Vanco 5/26 ---> 6/2  Administer antibiotics via Samuel  I have no objection to discharge with IV antibiotics  Check surveillance blood cultures with PMD next week after abx completed.    No need for outpt labs while on abx.

## 2018-05-30 NOTE — DISCHARGE NOTE ADULT - MEDICATION SUMMARY - MEDICATIONS TO TAKE
I will START or STAY ON the medications listed below when I get home from the hospital:    sodium bicarbonate 650 mg oral tablet  -- 1 tab(s) by mouth 2 times a day  -- Indication: For Need for prophylactic measure    gabapentin 300 mg oral capsule  -- 1 cap(s) by mouth once a day (at bedtime)  -- Indication: For Need for prophylactic measure    traZODone 100 mg oral tablet  -- 2 tab(s) by mouth once a day (at bedtime)  -- Indication: For Need for prophylactic measure    DULoxetine 60 mg oral delayed release capsule  -- 2 cap(s) by mouth once a day  -- Indication: For Need for prophylactic measure    Lomotil 2.5 mg-0.025 mg oral tablet  -- 2 tab(s) by mouth 3 times a day (before meals)  -- Indication: For Short gut syndrome    loperamide 2 mg oral tablet  -- 2 tab(s) by mouth 3 times a day (before meals)  -- Indication: For Short gut syndrome    ARIPiprazole 2 mg oral tablet  -- 1 tab(s) by mouth once a day (at bedtime)  -- Indication: For Need for prophylactic measure    amLODIPine 10 mg oral tablet  -- 1 tab(s) by mouth once a day (at bedtime)  -- Indication: For HTN (hypertension)    meropenem 1000 mg intravenous injection  -- 1000 milligram(s) intravenous every 12 hours    Last day of abx is 6/2  -- Indication: For Bacteremia    vancomycin 1 g intravenous injection  -- 1 gram(s) intravenous every 12 hours    Last day 6/2/18  -- Indication: For Bacteremia    ferrous sulfate 325 mg (65 mg elemental iron) oral tablet  -- 1 tab(s) by mouth 3 times a day  -- Indication: For Need for prophylactic measure    omeprazole 40 mg oral delayed release capsule  -- 1 cap(s) by mouth once a day  -- Indication: For Need for prophylactic measure    levothyroxine 137 mcg (0.137 mg) oral capsule  -- 1 cap(s) by mouth once a day  -- Indication: For Hypothyroid    hydrALAZINE 10 mg oral tablet  -- 3 tab(s) by mouth 3 times a day  -- Indication: For HTN (hypertension)    Multiple Vitamins oral tablet  -- 1 tab(s) by mouth once a day  -- Indication: For Need for prophylactic measure

## 2018-05-30 NOTE — PROGRESS NOTE ADULT - PROBLEM SELECTOR PLAN 7
- heparin SQ  - Diet: Regular.   - Dispo: Home  -[facility]: [phone]  Micro lab [facility]: [phone]  [facility]: [phone]

## 2018-05-30 NOTE — PROGRESS NOTE ADULT - SUBJECTIVE AND OBJECTIVE BOX
Follow Up:      Inverval History/ROS:Patient is a 52y old  Female who presents with a chief complaint of positive blood cultures (26 May 2018 20:08)  No fever.  Denies pain  Feels okay.      Allergies    aspirin (Hives)  Cipro (Angioedema)  Reglan (Muscle Pain)  Rocephin (Hives)  Zosyn (Hives)    Intolerances        ANTIMICROBIALS:  meropenem  IVPB 1000 every 12 hours  vancomycin  IVPB 1000 every 12 hours      OTHER MEDS:  amLODIPine   Tablet 10 milliGRAM(s) Oral at bedtime  ARIPiprazole 2 milliGRAM(s) Oral at bedtime  ascorbic acid 500 milliGRAM(s) Oral daily  chlorhexidine 4% Liquid 1 Application(s) Topical <User Schedule>  diphenoxylate/atropine 2 Tablet(s) Oral three times a day  DULoxetine 60 milliGRAM(s) Oral two times a day  ferrous    sulfate 325 milliGRAM(s) Oral daily  gabapentin 300 milliGRAM(s) Oral at bedtime  heparin  Injectable 5000 Unit(s) SubCutaneous every 8 hours  hydrALAZINE 30 milliGRAM(s) Oral three times a day  lactobacillus acidophilus 1 Tablet(s) Oral daily  levothyroxine 137 MICROGram(s) Oral daily  loperamide 2 milliGRAM(s) Oral three times a day  multivitamin 1 Tablet(s) Oral daily  pantoprazole    Tablet 40 milliGRAM(s) Oral before breakfast  sodium bicarbonate 650 milliGRAM(s) Oral two times a day  traZODone 200 milliGRAM(s) Oral at bedtime      Vital Signs Last 24 Hrs  T(C): 36.4 (30 May 2018 05:52), Max: 36.9 (29 May 2018 14:30)  T(F): 97.5 (30 May 2018 05:52), Max: 98.4 (29 May 2018 14:30)  HR: 86 (30 May 2018 10:39) (69 - 89)  BP: 145/80 (30 May 2018 05:52) (145/80 - 171/94)  BP(mean): --  RR: 18 (30 May 2018 05:52) (18 - 18)  SpO2: 98% (30 May 2018 10:39) (97% - 100%)    PHYSICAL EXAM:  General: [x ] non-toxic  HEAD/EYES: [ ] PERRL [x ] white sclera [ ] icterus  ENT:  [ ] normal [ x] supple [ ] thrush [ ] pharyngeal exudate  Cardiovascular:   [ ] murmur [x ] normal [ ] PPM/AICD  Respiratory:  [ x] clear to ausculation bilaterally  GI:  [x ] soft, non-tender, normal bowel sounds  :  [ ] escudero [ ] no CVA tenderness   Musculoskeletal:  [ ] no synovitis  Neurologic:  [ ] non-focal exam   Skin:  [x ] no rash  Lymph: [ ] no lymphadenopathy  Psychiatric:  [ x] appropriate affect [ ] alert & oriented  Lines:  [ x] no phlebitis xx[ ] central line                                11.7   3.64  )-----------( 130      ( 29 May 2018 06:05 )             35.4       05-29    138  |  100  |  19  ----------------------------<  112<H>  3.9   |  22  |  1.32<H>    Ca    8.9      29 May 2018 15:15  Phos  2.6     05-29  Mg     1.7     05-29    TPro  7.4  /  Alb  3.8  /  TBili  0.3  /  DBili  x   /  AST  50<H>  /  ALT  49<H>  /  AlkPhos  142<H>  05-29          MICROBIOLOGY:    RADIOLOGY:

## 2018-05-30 NOTE — PROGRESS NOTE ADULT - PROBLEM SELECTOR PLAN 4
- due to multiple surgeries from colon cancer, ileostomy and jejunostomy   - Informed patient to bring in TPN supplies when needed. Receives lipids on Fridays, and supplemental nutrition MW Saturday, however will hold off restarting based on ID recs.  -TPN recipe confirmed with pharmacy, in chart  - c/w Loperamide and lomotil  - continues on vitamin supplements  - Hgb no sign of iron deficiency (Hgb was13.4, although may be mildly dehydrated)

## 2018-05-30 NOTE — DISCHARGE NOTE ADULT - CARE PLAN
Principal Discharge DX:	Bacteremia  Goal:	Treatment  Assessment and plan of treatment:	You were told to go to the hospital after blood cultures drawn from Principal Discharge DX:	Bacteremia  Goal:	Treatment  Assessment and plan of treatment:	You were told to go to the hospital after blood cultures drawn from your Samuel were positive for Klebsiella oxytoca and Enterococcus casseliflavus.  Repeat blood cultures peripherally were negative.  ID was consulted and origin Principal Discharge DX:	Bacteremia  Goal:	Treatment  Assessment and plan of treatment:	You were told to go to the hospital after blood cultures drawn from your Samuel were positive for Klebsiella oxytoca and Enterococcus casseliflavus.  You were treated with IV antibiotics and were given IV antibiotics to finish your course outside the hospital.  Please make sure you take all your doses, even if you feel better.  If you experience any fever, chills, nausea, vomiting, abdominal pain, weakness, chest pain, shortness of breath seek medical attention immediately.  Secondary Diagnosis:	Creatinine elevation  Assessment and plan of treatment:	Your Creatinine was mildly elevated while you were in the hospital but this resolved with fluid administration.  Please continue to drink plenty of fluids when discharged and follow up with your primary doctor within 1 week of discharge to have repeat lab work drawn.  Secondary Diagnosis:	Short gut syndrome  Assessment and plan of treatment:	We held your TPN while in the hospital given concerns that your Samuel was infected.  Do not resume your TPN until you speak to your doctor and determine it is safe to do so.  Secondary Diagnosis:	Hypertension, unspecified type  Assessment and plan of treatment:	We changed your hydralazine dosing while in the hospital to 30mg three times a day.  Please follow up with your doctor regarding this change to determine if you should continue on this.  Secondary Diagnosis:	Hypothyroid  Assessment and plan of treatment:	Please take all your medications as prescribed and follow up with your primary doctor within 1-2 weeks of discharge.

## 2018-05-30 NOTE — DISCHARGE NOTE ADULT - PATIENT PORTAL LINK FT
You can access the [facility] Patient Portal, offered by [facility], by registering with the following website: [email or web address]

## 2018-05-30 NOTE — PROGRESS NOTE ADULT - SUBJECTIVE AND OBJECTIVE BOX
Patient is a 52y old  Female who presents with a chief complaint of positive blood cultures (26 May 2018 20:08)      Mike Gipson PGY1 [facility] pager [phone]      SUBJECTIVE / OVERNIGHT EVENTS: No overnight events. Patient expresses fatigue and frustration       MEDICATIONS  (STANDING):  amLODIPine   Tablet 10 milliGRAM(s) Oral at bedtime  ARIPiprazole 2 milliGRAM(s) Oral at bedtime  ascorbic acid 500 milliGRAM(s) Oral daily  chlorhexidine 4% Liquid 1 Application(s) Topical <User Schedule>  diphenoxylate/atropine 2 Tablet(s) Oral three times a day  DULoxetine 60 milliGRAM(s) Oral two times a day  ferrous    sulfate 325 milliGRAM(s) Oral daily  gabapentin 300 milliGRAM(s) Oral at bedtime  heparin  Injectable 5000 Unit(s) SubCutaneous every 8 hours  hydrALAZINE 30 milliGRAM(s) Oral three times a day  lactobacillus acidophilus 1 Tablet(s) Oral daily  levothyroxine 137 MICROGram(s) Oral daily  loperamide 2 milliGRAM(s) Oral three times a day  meropenem  IVPB 1000 milliGRAM(s) IV Intermittent every 12 hours  multivitamin 1 Tablet(s) Oral daily  pantoprazole    Tablet 40 milliGRAM(s) Oral before breakfast  sodium bicarbonate 650 milliGRAM(s) Oral two times a day  traZODone 200 milliGRAM(s) Oral at bedtime  vancomycin  IVPB 1000 milliGRAM(s) IV Intermittent every 12 hours    MEDICATIONS  (PRN):      T(C): 36.4 (05-30-18 @ 05:52), Max: 36.9 (05-29-18 @ 14:30)  HR: 84 (05-30-18 @ 07:11) (69 - 89)  BP: 145/80 (05-30-18 @ 05:52) (145/80 - 171/94)  RR: 18 (05-30-18 @ 05:52) (18 - 18)  SpO2: 99% (05-30-18 @ 07:11) (97% - 100%)    PHYSICAL EXAM  GENERAL: NAD, well-developed  NEURO: AO x3, PERRLA, EOMI, motor strength in tact in 4/4 extremities, sensation in tact  HEAD:  Atraumatic, Normocephalic  EYES: conjunctiva and sclera clear  NECK: Supple, No JVD, no lymphadenopathy, no thyromegaly  CHEST/LUNG: Clear to auscultation bilaterally; No wheezes, rales or rhonchi  HEART: Regular rate and rhythm; No murmurs, rubs, or gallops  ABDOMEN: Soft, Nontender, Nondistended; Bowel sounds present, no masses.  EXTREMITIES:  2+ Peripheral Pulses, No clubbing, cyanosis, or edema  SKIN: Warm, dry, in tact, no rashes or lesions  PSYCH: affect appropriate    LABS:                        11.7   3.64  )-----------( 130      ( 29 May 2018 06:05 )             35.4     05-29    138  |  100  |  19  ----------------------------<  112<H>  3.9   |  22  |  1.32<H>    Ca    8.9      29 May 2018 15:15  Phos  2.6     05-29  Mg     1.7     05-29    TPro  7.4  /  Alb  3.8  /  TBili  0.3  /  DBili  x   /  AST  50<H>  /  ALT  49<H>  /  AlkPhos  142<H>  05-29    PT/INR - ( 29 May 2018 12:10 )   PT: 12.0 SEC;   INR: 1.04          PTT - ( 29 May 2018 12:10 )  PTT:31.0 SEC        I&O's Summary    29 May 2018 07:01  -  30 May 2018 07:00  --------------------------------------------------------  IN: 1150 mL / OUT: 1750 mL / NET: -600 mL Patient is a 52y old  Female who presents with a chief complaint of positive blood cultures (26 May 2018 20:08)      Mike Gipson PGY1 [facility] pager [phone]      SUBJECTIVE / OVERNIGHT EVENTS: No overnight events. Patient expresses fatigue and frustration at remeaining in the hospital.  ROS negative.        MEDICATIONS  (STANDING):  amLODIPine   Tablet 10 milliGRAM(s) Oral at bedtime  ARIPiprazole 2 milliGRAM(s) Oral at bedtime  ascorbic acid 500 milliGRAM(s) Oral daily  chlorhexidine 4% Liquid 1 Application(s) Topical <User Schedule>  diphenoxylate/atropine 2 Tablet(s) Oral three times a day  DULoxetine 60 milliGRAM(s) Oral two times a day  ferrous    sulfate 325 milliGRAM(s) Oral daily  gabapentin 300 milliGRAM(s) Oral at bedtime  heparin  Injectable 5000 Unit(s) SubCutaneous every 8 hours  hydrALAZINE 30 milliGRAM(s) Oral three times a day  lactobacillus acidophilus 1 Tablet(s) Oral daily  levothyroxine 137 MICROGram(s) Oral daily  loperamide 2 milliGRAM(s) Oral three times a day  meropenem  IVPB 1000 milliGRAM(s) IV Intermittent every 12 hours  multivitamin 1 Tablet(s) Oral daily  pantoprazole    Tablet 40 milliGRAM(s) Oral before breakfast  sodium bicarbonate 650 milliGRAM(s) Oral two times a day  traZODone 200 milliGRAM(s) Oral at bedtime  vancomycin  IVPB 1000 milliGRAM(s) IV Intermittent every 12 hours    MEDICATIONS  (PRN):      T(C): 36.4 (05-30-18 @ 05:52), Max: 36.9 (05-29-18 @ 14:30)  HR: 84 (05-30-18 @ 07:11) (69 - 89)  BP: 145/80 (05-30-18 @ 05:52) (145/80 - 171/94)  RR: 18 (05-30-18 @ 05:52) (18 - 18)  SpO2: 99% (05-30-18 @ 07:11) (97% - 100%)    PHYSICAL EXAM  GENERAL: NAD,  NEURO: AO x3, PERRLA, EOMI, motor strength in tact in 4/4 extremities, sensation in tact  HEAD:  Atraumatic, Normocephalic  EYES: conjunctiva and sclera clear  NECK: Supple, No JVD,   CHEST/LUNG: Clear to auscultation bilaterally; No wheezes, rales or rhonchi, Hichman in place, no erythema drainage or fluctuance, CDI  HEART: Regular rate and rhythm; No murmurs, rubs, or gallops  ABDOMEN: Soft, Nontender, Nondistended; Bowel sounds present, no masses.  Jejunostomy bag in place draining brown stool, ileostomy draining urine from ileal conduit, clear yellow  EXTREMITIES:  2+ Peripheral Pulses, No clubbing, cyanosis, or edema  SKIN: Warm, dry, in tact, no rashes or lesions R nephrostomy tube in place draining clear yellow urine  PSYCH: affect appropriate      LABS:                        11.7   3.64  )-----------( 130      ( 29 May 2018 06:05 )             35.4     05-29    138  |  100  |  19  ----------------------------<  112<H>  3.9   |  22  |  1.32<H>    Ca    8.9      29 May 2018 15:15  Phos  2.6     05-29  Mg     1.7     05-29    TPro  7.4  /  Alb  3.8  /  TBili  0.3  /  DBili  x   /  AST  50<H>  /  ALT  49<H>  /  AlkPhos  142<H>  05-29    PT/INR - ( 29 May 2018 12:10 )   PT: 12.0 SEC;   INR: 1.04          PTT - ( 29 May 2018 12:10 )  PTT:31.0 SEC        I&O's Summary    29 May 2018 07:01  -  30 May 2018 07:00  --------------------------------------------------------  IN: 1150 mL / OUT: 1750 mL / NET: -600 mL

## 2018-05-30 NOTE — DISCHARGE NOTE ADULT - ADDITIONAL INSTRUCTIONS
Follow up with your doctor within 1 week of discharge in order to have repeat blood work done, discuss restarting TPN, and to go over all your medications.  We increased your dose of hydralazine, please follow up regarding this as well.

## 2018-05-30 NOTE — PROGRESS NOTE ADULT - ASSESSMENT
52 y.o. female with colon cancer s/p resection/chemo/ radiation (2007) with jejunostomy, ileostomy c/b short gut syndrome, right sided nephrostomy tube, HTN, hypothyroidism admitted for bacteremia, sources include urine or hichman catheter

## 2018-05-30 NOTE — DISCHARGE NOTE ADULT - PLAN OF CARE
Treatment You were told to go to the hospital after blood cultures drawn from You were told to go to the hospital after blood cultures drawn from your Samuel were positive for Klebsiella oxytoca and Enterococcus casseliflavus.  Repeat blood cultures peripherally were negative.  ID was consulted and origin You were told to go to the hospital after blood cultures drawn from your Samuel were positive for Klebsiella oxytoca and Enterococcus casseliflavus.  You were treated with IV antibiotics and were given IV antibiotics to finish your course outside the hospital.  Please make sure you take all your doses, even if you feel better.  If you experience any fever, chills, nausea, vomiting, abdominal pain, weakness, chest pain, shortness of breath seek medical attention immediately. Your Creatinine was mildly elevated while you were in the hospital but this resolved with fluid administration.  Please continue to drink plenty of fluids when discharged and follow up with your primary doctor within 1 week of discharge to have repeat lab work drawn. We held your TPN while in the hospital given concerns that your Samuel was infected.  Do not resume your TPN until you speak to your doctor and determine it is safe to do so. We changed your hydralazine dosing while in the hospital to 30mg three times a day.  Please follow up with your doctor regarding this change to determine if you should continue on this. Please take all your medications as prescribed and follow up with your primary doctor within 1-2 weeks of discharge.

## 2018-05-30 NOTE — DISCHARGE NOTE ADULT - HOSPITAL COURSE
HPI:52 y.o. female with colon cancer s/p resection/chemo/ radiation (2007) with jejunostomy, ileostomy c/b short gut syndrome, right sided nephrostomy tube, HTN, hypothyroidism presenting from home with positive blood cultures. The patient states earlier in the week the patient was having night sweats and low grade temperature of 99.1 F which she reported to her physician Also stated her ID doctor noticed an increase her BUN/Cr as well. Blood cultures were collected on the 5/25. The patient also endorsed cloudy urine with worsening smell from her nephrostomy bag.  The patient had recent exchange of her nephrostomy tube on 5/22, stated after the exchange the patient noticed "pus-like" material after the first day, then cleared up afterwards. Also endorsed one episode of vomiting today. Patient has a history of bacteremia, 3 episodes over the last year, needing IV abx. Along with PO intake the patient also receives TPN and fluid supplementation as during the week, which her family is trained in providing via a Samuel Catheter. The patient is originally from [place], [place] and is in [place] for vacation visiting her daughter. No sick contacts recently.     Course:  Patient was admitted to medicine and had repeat cultures drawn peripherally that were negative.  Lab results from [place] showed the organisms Klebsiella oxytoca and enterrococcus casseliflavus.  ID was consutled and recommended a course of Vanc and meropenem based on sensitivities, Samuel removal, and nephrostomy tube exchange.  Urology was consulted and did not see an indication for NT exchange as it was recently placed.  The patient also did not want it exchanged.  IR was consulted for Samuel removal but did not see an indication for this as HPI:52 y.o. female with colon cancer s/p resection/chemo/ radiation (2007) with jejunostomy, ileostomy c/b short gut syndrome, right sided nephrostomy tube, HTN, hypothyroidism presenting from home with positive blood cultures. The patient states earlier in the week the patient was having night sweats and low grade temperature of 99.1 F which she reported to her physician Also stated her ID doctor noticed an increase her BUN/Cr as well. Blood cultures were collected on the 5/25. The patient also endorsed cloudy urine with worsening smell from her nephrostomy bag.  The patient had recent exchange of her nephrostomy tube on 5/22, stated after the exchange the patient noticed "pus-like" material after the first day, then cleared up afterwards. Also endorsed one episode of vomiting today. Patient has a history of bacteremia, 3 episodes over the last year, needing IV abx. Along with PO intake the patient also receives TPN and fluid supplementation as during the week, which her family is trained in providing via a Samuel Catheter. The patient is originally from [place], [place] and is in [place] for vacation visiting her daughter. No sick contacts recently.     Course:  Patient was admitted to medicine and had repeat cultures drawn peripherally that were negative.  Lab results from [place] showed the organisms Klebsiella oxytoca and enterrococcus casseliflavus.  ID was consutled and recommended a course of Vanc and meropenem based on sensitivities, Samuel removal, and nephrostomy tube exchange.  Urology was consulted and did not see an indication for NT exchange as it was recently placed.  The patient also did not want it exchanged.  IR was consulted for Samuel removal but did not see an indication for this as patient WBC was normal. no fever, and negative blood cx inpatient. HPI:52 y.o. female with colon cancer s/p resection/chemo/ radiation (2007) with jejunostomy, ileostomy c/b short gut syndrome, right sided nephrostomy tube, HTN, hypothyroidism presenting from home with positive blood cultures. The patient states earlier in the week the patient was having night sweats and low grade temperature of 99.1 F which she reported to her physician Also stated her ID doctor noticed an increase her BUN/Cr as well. Blood cultures were collected on the 5/25. The patient also endorsed cloudy urine with worsening smell from her nephrostomy bag.  The patient had recent exchange of her nephrostomy tube on 5/22, stated after the exchange the patient noticed "pus-like" material after the first day, then cleared up afterwards. Also endorsed one episode of vomiting today. Patient has a history of bacteremia, 3 episodes over the last year, needing IV abx. Along with PO intake the patient also receives TPN and fluid supplementation as during the week, which her family is trained in providing via a Samuel Catheter. The patient is originally from [place], [place] and is in [place] for vacation visiting her daughter. No sick contacts recently.     Course:  Patient was admitted to medicine and had repeat cultures drawn peripherally that were negative.  Lab results from [place] showed the organisms Klebsiella oxytoca and enterrococcus casseliflavus.  ID was consutled and recommended a course of Vanc and meropenem based on sensitivities, Samuel removal, holding TPN and nephrostomy tube exchange.  Urology was consulted and did not see an indication for NT exchange as it was recently placed.  The patient also did not want it exchanged.  IR was consulted for Samuel removal but did not see an indication for this as patient WBC was normal no fever, and negative blood cx inpatient.  It was thus determined that the patient would just continue the IV abx through the Smauel through 6/2/18 and then follow up with her doctors in [place]  Course was complicated by an LARRY which resolved with fluid administration.

## 2018-05-30 NOTE — DISCHARGE NOTE ADULT - MEDICATION SUMMARY - MEDICATIONS TO CHANGE
I will SWITCH the dose or number of times a day I take the medications listed below when I get home from the hospital:    hydrALAZINE 10 mg oral tablet  -- 2 tab(s) by mouth 2 times a day

## 2018-05-30 NOTE — PROGRESS NOTE ADULT - PROBLEM SELECTOR PLAN 2
- Pt endorses foul smelling urine, cloudy urine and pus from the nephrostomy tube following exchange   - UA grossly positive with pyuria and large LE  - c/w Deep and vanco  -Blood cx ngd  -f/u uro recs

## 2018-05-30 NOTE — DISCHARGE NOTE ADULT - CONDITIONS AT DISCHARGE
Patient is stable, improved , independent with all of her care; her Right Chest Port is de-accessed flushed with Normal saline and 3cc Heplock Solution.  Left Ileus is patent with pasty stool and Urostomy is also patent.  She uses the CPAP at night ; vital signs are stable and Instructions are given to her. Patient is stable, improved , independent with all of her care; her Right side Chest has a Samuel catheter  is flushed with Normal saline and 3cc Heplock Solution.  Left Ileus is patent with pasty stool and Urostomy is also patent.  She uses the CPAP at night ; vital signs are stable and Instructions are given to her.

## 2018-05-30 NOTE — PROGRESS NOTE ADULT - PROBLEM SELECTOR PLAN 3
- Current Cr is 1.5 from 1.28,   - unknown baseline   -s/p  IVF LR at 100 mL/Hr, will reorder  -will CTM

## 2018-05-30 NOTE — PROGRESS NOTE ADULT - PROBLEM SELECTOR PLAN 1
- Patient reported to have positive blood cultures with GPC and GNR as an outpatient   - Pt has nonspecific symptoms on admission (night sweats, low grade temperature)  - possible sources includes UTI vs catheter line infection, ID leaning towards samuel  -c/w Deep and Vancomycin   - f/u Blood and urine cultures   -patient growing Klebsiella oxytoca and Enterococcus casseliflavus, sensitive to current regimen from blood cultures drawn 5/25 in [place].  Report and sensitivities in physical chart  -ID consulted, rec Samuel removal, nephrostomy tube exchange and holding TPN   -IR stating no indication for samuel removal as patient is currently demonstrating no signs of active infection  -Urology stating no indication to exchange neph tube as it was recently placed  - IVF hydration

## 2018-05-31 VITALS
SYSTOLIC BLOOD PRESSURE: 155 MMHG | DIASTOLIC BLOOD PRESSURE: 92 MMHG | OXYGEN SATURATION: 94 % | TEMPERATURE: 98 F | RESPIRATION RATE: 19 BRPM | HEART RATE: 83 BPM

## 2018-05-31 LAB
BACTERIA BLD CULT: SIGNIFICANT CHANGE UP
BACTERIA BLD CULT: SIGNIFICANT CHANGE UP
VANCOMYCIN TROUGH SERPL-MCNC: 16.3 UG/ML — SIGNIFICANT CHANGE UP (ref 10–20)

## 2018-05-31 PROCEDURE — 99239 HOSP IP/OBS DSCHRG MGMT >30: CPT

## 2018-05-31 RX ORDER — HYDRALAZINE HCL 50 MG
3 TABLET ORAL
Qty: 45 | Refills: 0 | OUTPATIENT
Start: 2018-05-31 | End: 2018-06-04

## 2018-05-31 RX ORDER — HYDRALAZINE HCL 50 MG
2 TABLET ORAL
Qty: 0 | Refills: 0 | COMMUNITY

## 2018-05-31 RX ORDER — LIPASE/PROTEASE/AMYLASE 16-48-48K
1 CAPSULE,DELAYED RELEASE (ENTERIC COATED) ORAL
Qty: 0 | Refills: 0 | COMMUNITY

## 2018-05-31 RX ADMIN — Medication 325 MILLIGRAM(S): at 13:56

## 2018-05-31 RX ADMIN — Medication 30 MILLIGRAM(S): at 13:56

## 2018-05-31 RX ADMIN — MEROPENEM 100 MILLIGRAM(S): 1 INJECTION INTRAVENOUS at 16:04

## 2018-05-31 RX ADMIN — HYDROMORPHONE HYDROCHLORIDE 0.5 MILLIGRAM(S): 2 INJECTION INTRAMUSCULAR; INTRAVENOUS; SUBCUTANEOUS at 00:56

## 2018-05-31 RX ADMIN — Medication 2 MILLIGRAM(S): at 13:56

## 2018-05-31 RX ADMIN — Medication 30 MILLIGRAM(S): at 06:09

## 2018-05-31 RX ADMIN — PANTOPRAZOLE SODIUM 40 MILLIGRAM(S): 20 TABLET, DELAYED RELEASE ORAL at 06:09

## 2018-05-31 RX ADMIN — Medication 137 MICROGRAM(S): at 06:09

## 2018-05-31 RX ADMIN — Medication 2 TABLET(S): at 06:09

## 2018-05-31 RX ADMIN — DULOXETINE HYDROCHLORIDE 60 MILLIGRAM(S): 30 CAPSULE, DELAYED RELEASE ORAL at 06:09

## 2018-05-31 RX ADMIN — CHLORHEXIDINE GLUCONATE 1 APPLICATION(S): 213 SOLUTION TOPICAL at 06:09

## 2018-05-31 RX ADMIN — MEROPENEM 100 MILLIGRAM(S): 1 INJECTION INTRAVENOUS at 06:09

## 2018-05-31 RX ADMIN — Medication 650 MILLIGRAM(S): at 06:09

## 2018-05-31 RX ADMIN — Medication 2 MILLIGRAM(S): at 06:09

## 2018-05-31 RX ADMIN — HYDROMORPHONE HYDROCHLORIDE 0.5 MILLIGRAM(S): 2 INJECTION INTRAMUSCULAR; INTRAVENOUS; SUBCUTANEOUS at 00:41

## 2018-05-31 RX ADMIN — Medication 1 TABLET(S): at 13:56

## 2018-05-31 RX ADMIN — Medication 500 MILLIGRAM(S): at 13:57

## 2018-05-31 RX ADMIN — Medication 2 TABLET(S): at 14:02

## 2018-05-31 RX ADMIN — Medication 250 MILLIGRAM(S): at 11:56

## 2018-05-31 NOTE — DIETITIAN INITIAL EVALUATION ADULT. - PROBLEM SELECTOR PLAN 3
- Current Cr is 1.41  - unknown baseline   - IVF LR at 100 mL/Hr x 15 hours (especially since patient also receives supplemental fluids via Samuel catheter ~ twice weekly)  - would continue to monitor

## 2018-05-31 NOTE — PROGRESS NOTE ADULT - ATTENDING COMMENTS
Pt seen and examined by me Agree with resident    # Bacteremia-Blood cultures faxed from outpt provider in [place]  5/25 -positive for  Klebsiella oxytoca and enterococcus  BC 5/26- NGTD  UC from nephrostomy tube - Klebsiella   On Vancomycin/Meropenem  Monitor vanc  level     Appreciate ID recommendations- Hold TPN until current antibiotic course completed and patient stable. Continue Meropenem/Vanco 5/26 ---> 6/2  Administer antibiotics via Samuel   Check surveillance blood cultures with PMD next week after abx completed.  No need for outpt labs while on abx.     CHRIS CM- Await home Abx set up  DC planning 35 mins  Pt advsied to FU with PMD/IN within 1 week of dc .

## 2018-05-31 NOTE — PROGRESS NOTE ADULT - PROBLEM SELECTOR PLAN 5
-currently wnl  - c/w hydralazine and amlodipine  - follow with repeat measurements

## 2018-05-31 NOTE — PROGRESS NOTE ADULT - PROBLEM SELECTOR PLAN 7
- heparin SQ  - Diet: Regular.   - Dispo: Home  -[facility] for [facility]: [phone]  Micro lab [facility]: [phone]  [facility]: [phone]

## 2018-05-31 NOTE — DIETITIAN INITIAL EVALUATION ADULT. - NS AS NUTRI INTERV COLLABORAT
1)Regular diet, as tolerated.              2)Continue Multivitamin, Fe & Vitamin C for micronutrient coverage               3)Obtain weekly weights                  4)RDN remains available.  Tuyet Desai, OLGAN, CDN  pager [phone]

## 2018-05-31 NOTE — DIETITIAN INITIAL EVALUATION ADULT. - PROBLEM SELECTOR PLAN 4
- due to multiple surgeries from colon cancer, ileostomy and jejunostomy   - Informed patient to bring in TPN supplies when needed. Receives lipids on Fridays, and supplemental nutrition on Saturday  - c/w Loperamide and lomotil  - continues on vitamin supplements  - Hgb no sign of iron deficiency (Hgb = 13.4, although may be mildly dehydrated)

## 2018-05-31 NOTE — PROGRESS NOTE ADULT - PROBLEM SELECTOR PLAN 1
- Patient reported to have positive blood cultures with GPC and GNR as an outpatient   - Pt has nonspecific symptoms on admission (night sweats, low grade temperature)  - possible sources includes UTI vs catheter line infection, ID leaning towards samuel  -c/w Deep and Vancomycin   - f/u Blood and urine cultures   -patient growing Klebsiella oxytoca and Enterococcus casseliflavus, sensitive to current regimen from blood cultures drawn 5/25 in [place].  Report and sensitivities in physical chart  -ID consulted, rec Samuel removal, nephrostomy tube exchange and holding TPN   -IR stating no indication for samuel removal as patient is currently demonstrating no signs of active infection  -Urology stating no indication to exchange neph tube as it was recently placed  - IVF hydration

## 2018-05-31 NOTE — PROGRESS NOTE ADULT - ASSESSMENT
5/25/18 Blood isolates identified as Klebsiella oxytoxa and Enterococcus cassiflavus  Klebs growing from Nephrostomy drainage  she is clinically well  Follow up blood cultures are netative.    The patient states she is experienced at home iv antibiotic administration    suggest:  Hold TPN until current antibiotic course completed and patient stable  Continue Meropenem/Vanco 5/26 ---> 6/2  Administer antibiotics via Samuel  I have no objection to discharge with IV antibiotics  Check surveillance blood cultures with PMD next week after abx completed.    No need for outpt labs while on abx.   I gave patient my contact info if her doctors in [place] have any questions

## 2018-05-31 NOTE — DIETITIAN INITIAL EVALUATION ADULT. - NS AS NUTRI INTERV PARENTERAL
Pt. received typically receives TPN 4x/week (MWFSat) at home.  Currently on hold, per ID recommendations.

## 2018-05-31 NOTE — PROGRESS NOTE ADULT - SUBJECTIVE AND OBJECTIVE BOX
Patient is a 52y old  Female who presents with a chief complaint of positive blood cultures (30 May 2018 12:39)      Mike Gipson PGY1 [facility] pager [phone]      SUBJECTIVE / OVERNIGHT EVENTS: No overnight events. No complaints this AM. Patient denies CP, SOB, N/V/D/F/C/CP/SOB.    MEDICATIONS  (STANDING):  amLODIPine   Tablet 10 milliGRAM(s) Oral at bedtime  ARIPiprazole 2 milliGRAM(s) Oral at bedtime  ascorbic acid 500 milliGRAM(s) Oral daily  chlorhexidine 4% Liquid 1 Application(s) Topical <User Schedule>  diphenoxylate/atropine 2 Tablet(s) Oral three times a day  DULoxetine 60 milliGRAM(s) Oral two times a day  ferrous    sulfate 325 milliGRAM(s) Oral daily  gabapentin 300 milliGRAM(s) Oral at bedtime  heparin  Injectable 5000 Unit(s) SubCutaneous every 8 hours  hydrALAZINE 30 milliGRAM(s) Oral three times a day  lactobacillus acidophilus 1 Tablet(s) Oral daily  levothyroxine 137 MICROGram(s) Oral daily  loperamide 2 milliGRAM(s) Oral three times a day  meropenem  IVPB 1000 milliGRAM(s) IV Intermittent every 12 hours  multivitamin 1 Tablet(s) Oral daily  pantoprazole    Tablet 40 milliGRAM(s) Oral before breakfast  sodium bicarbonate 650 milliGRAM(s) Oral two times a day  traZODone 200 milliGRAM(s) Oral at bedtime  vancomycin  IVPB 1000 milliGRAM(s) IV Intermittent every 12 hours    MEDICATIONS  (PRN):  HYDROmorphone  Injectable 0.5 milliGRAM(s) IV Push every 6 hours PRN Severe Pain (7 - 10)      T(C): 36.8 (05-31-18 @ 06:07), Max: 37.3 (05-30-18 @ 21:18)  HR: 90 (05-31-18 @ 07:51) (80 - 95)  BP: 132/69 (05-31-18 @ 06:07) (132/69 - 152/91)  RR: 18 (05-31-18 @ 06:07) (17 - 18)  SpO2: 99% (05-31-18 @ 07:51) (98% - 100%)    PHYSICAL EXAM  GENERAL: NAD,  NEURO: AO x3, PERRLA, EOMI, motor strength in tact in 4/4 extremities, sensation in tact  HEAD:  Atraumatic, Normocephalic  EYES: conjunctiva and sclera clear  NECK: Supple, No JVD,   CHEST/LUNG: Clear to auscultation bilaterally; No wheezes, rales or rhonchi, Hichman in place, no erythema drainage or fluctuance, CDI  HEART: Regular rate and rhythm; No murmurs, rubs, or gallops  ABDOMEN: Soft, Nontender, Nondistended; Bowel sounds present, no masses.  Jejunostomy bag in place draining brown stool, ileostomy draining urine from ileal conduit, clear yellow  EXTREMITIES:  2+ Peripheral Pulses, No clubbing, cyanosis, or edema  SKIN: Warm, dry, in tact, no rashes or lesions R nephrostomy tube in place draining clear yellow    LABS:                        11.1   4.58  )-----------( 130      ( 30 May 2018 13:10 )             33.8     05-30    139  |  103  |  16  ----------------------------<  104<H>  4.2   |  24  |  1.15    Ca    8.8      30 May 2018 13:10  Phos  2.9     05-30  Mg     1.6     05-30    TPro  6.7  /  Alb  3.5  /  TBili  0.3  /  DBili  x   /  AST  46<H>  /  ALT  46<H>  /  AlkPhos  125<H>  05-30    PT/INR - ( 29 May 2018 12:10 )   PT: 12.0 SEC;   INR: 1.04          PTT - ( 29 May 2018 12:10 )  PTT:31.0 SEC        I&O's Summary    30 May 2018 07:01  -  31 May 2018 07:00  --------------------------------------------------------  IN: 0 mL / OUT: 300 mL / NET: -300 mL

## 2018-05-31 NOTE — DIETITIAN INITIAL EVALUATION ADULT. - PERTINENT MEDS FT
Multivitamin, Vitamin C, Ferrous sulfate, Abilify, Cymbalta, Lomotil, Imodium, Synthroid, Lactobacillus acidophilus

## 2018-05-31 NOTE — DIETITIAN INITIAL EVALUATION ADULT. - OTHER INFO
Pt. reports good appetite/PO intake & denies food allergies, nausea/vomiting, or issues with chewing/swallowing & no noted edema, or pressure injuries.  Pt. reports ileostomy output as "normal".  Pt. is visiting from [place]... typically receives TPN 4 days/week (MWFSat) due to short gut syndrome which provides total of 656Kcals, as well as micronutrients.  Reviewed documentation of home TPN regimen (in paper chart).  At this time, TPN on hold, as per ID recs. , until Abx course completed.

## 2018-05-31 NOTE — DIETITIAN INITIAL EVALUATION ADULT. - PROBLEM SELECTOR PLAN 5
- elevated to 179/102 since admission  - c/w hydralazine and amlodipine  - follow with repeat measurements

## 2018-05-31 NOTE — DIETITIAN INITIAL EVALUATION ADULT. - PROBLEM SELECTOR PLAN 1
- Patient reported to have positive blood cultures with GPC and GNR as an outpatient   - Pt has nonspecific symptoms (night sweats, low grade temperature)  - possible sources includes UTI vs catheter line infection   - Most likely source is UTI given the turbid urine pyuria and large LE on the UA   - would cover broadly for now, given pt's history of recurrent bacteremia needing IV abx  - c/w Deep and Vancomycin   - f/u Blood and urine cultures   - [facility] to [facility] on speciation and sensitivities  - IVF hydration

## 2018-05-31 NOTE — PROGRESS NOTE ADULT - SUBJECTIVE AND OBJECTIVE BOX
Follow Up:      Interval History/ROS: no complaints, anticipating disharge    Allergies  aspirin (Hives)  Cipro (Angioedema)  Reglan (Muscle Pain)  Rocephin (Hives)  Zosyn (Hives)    ANTIMICROBIALS:  meropenem  IVPB 1000 every 12 hours  vancomycin  IVPB 1000 every 12 hours    OTHER MEDS:  MEDICATIONS  (STANDING):  amLODIPine   Tablet 10 at bedtime  ARIPiprazole 2 at bedtime  diphenoxylate/atropine 2 three times a day  DULoxetine 60 two times a day  gabapentin 300 at bedtime  heparin  Injectable 5000 every 8 hours  hydrALAZINE 30 three times a day  HYDROmorphone  Injectable 0.5 every 6 hours PRN  levothyroxine 137 daily  loperamide 2 three times a day  pantoprazole    Tablet 40 before breakfast  traZODone 200 at bedtime    Vital Signs Last 24 Hrs  T(C): 36.8 (31 May 2018 14:39), Max: 37.3 (30 May 2018 21:18)  T(F): 98.3 (31 May 2018 14:39), Max: 99.1 (30 May 2018 21:18)  HR: 83 (31 May 2018 14:39) (80 - 95)  BP: 155/92 (31 May 2018 14:39) (132/69 - 155/92)  BP(mean): --  RR: 19 (31 May 2018 14:39) (18 - 19)  SpO2: 94% (31 May 2018 14:39) (94% - 100%)    PHYSICAL EXAM:  General: WN/WD NAD, Non-toxic  Neurology: A&Ox3, nonfocal  Respiratory: no resp distress  Abdominal: non-distended  Extremities: No edema,   Line Sites: Clear  Skin: No rash                        11.1   4.58  )-----------( 130      ( 30 May 2018 13:10 )             33.8       05-30    139  |  103  |  16  ----------------------------<  104<H>  4.2   |  24  |  1.15    Ca    8.8      30 May 2018 13:10  Phos  2.9     05-30  Mg     1.6     05-30    TPro  6.7  /  Alb  3.5  /  TBili  0.3  /  DBili  x   /  AST  46<H>  /  ALT  46<H>  /  AlkPhos  125<H>  05-30      MICROBIOLOGY:  BROVIAC/BASURTO SINGLE LUMEN  05-29-18 --  --  --      NEPHROSTOMY - RIGHT  05-26-18 --  --  Klebsiella oxytoca  Culture - Urine (05.26.18 @ 17:22)    -  Amikacin: S <=16 ELLA    -  Ampicillin: R >16 ELLA    -  Ampicillin/Sulbactam: S <=8/4 ELLA    -  Aztreonam: S <=4 ELLA    -  Cefazolin: S <=8 ELLA    -  Cefepime: S <=4 ELLA    -  Cefoxitin: S <=8 ELLA    -  Ceftazidime: S <=1 ELLA    -  Ceftriaxone: S <=1 ELLA    -  Ciprofloxacin: S <=1 ELLA    -  Ertapenem: S <=1 ELLA    -  Gentamicin: S <=4 ELLA    -  Imipenem: S <=1 ELLA    -  Levofloxacin: S <=2 LELA    -  Meropenem: S <=1 ELLA    -  Nitrofurantoin: S <=32 ELLA    -  Piperacillin/Tazobactam: S <=16 ELLA    -  Tigecycline: S <=2 ELLA    -  Tobramycin: S <=4 ELLA    -  Trimethoprim/Sulfamethoxazole: R >2/38 ELLA    Culture - Urine:   Insufficient growth, culture re-incubated.    Culture - Urine:   COLONY COUNT: > = 100,000 CFU/ML    Specimen Source: NEPHROSTOMY - RIGHT    Organism Identification: Klebsiella oxytoca    Organism: Klebsiella oxytoca    Method Type: MICROSCAN NEG URINE COMBO 61      BLOOD  05-26-18 --  --  --      BROVIAC/BASURTO SINGLE LUMEN    Vancomycin Level, Trough: 16.3 ug/mL (05-31-18 @ 10:15)  Vancomycin Level, Trough: 25.4 ug/mL (05-30-18 @ 21:39)    RADIOLOGY:      Reji López MD; Division of Infectious Disease; Pager: [phone]; nights and weekends: [phone]

## 2018-05-31 NOTE — PROGRESS NOTE ADULT - PROBLEM SELECTOR PROBLEM 3
Creatinine elevation

## 2018-05-31 NOTE — PROGRESS NOTE ADULT - PROVIDER SPECIALTY LIST ADULT
Infectious Disease
Internal Medicine
Urology
Infectious Disease
Internal Medicine
Internal Medicine

## 2018-05-31 NOTE — PROGRESS NOTE ADULT - PROBLEM SELECTOR PLAN 6
- c/w Synthroid 137mcg q dialy  -TSH 4.63

## 2018-06-03 LAB — BACTERIA BLD CULT: SIGNIFICANT CHANGE UP

## 2018-08-22 NOTE — H&P ADULT - PROBLEM/PLAN-4
Patient Outreached Attempted. Received patient's daughter's voicemail box. Message left requesting call back.
DISPLAY PLAN FREE TEXT

## 2020-02-18 NOTE — PATIENT PROFILE ADULT. - COMFORT LEVEL, ACCEPTABLE
Detail Level: Detailed Render Post-Care Instructions In Note?: no Consent: The patient's consent was obtained including but not limited to risks of crusting, scabbing, blistering, scarring, darker or lighter pigmentary change, recurrence, incomplete removal and infection. Medical Necessity Clause: This procedure was medically necessary because the lesions that were treated were: Medical Necessity Information: It is in your best interest to select a reason for this procedure from the list below. All of these items fulfill various CMS LCD requirements except the new and changing color options. Anesthesia Volume In Cc: 0.1 Treatment Number (Will Not Render If 0): 0 Post-Care Instructions: I reviewed with the patient in detail post-care instructions. Patient is to wear sunprotection, and avoid picking at any of the treated lesions. Pt may apply Vaseline to crusted or scabbing areas. 2

## 2021-11-26 NOTE — PROVIDER CONTACT NOTE (OTHER) - REASON
/107 Group Topic: [facility] RAMOS Education    Date: 11/26/2021  Start Time: 1100  End Time: 3165  Facilitators: ORLANDO Coulter    Focus: Recovery Planning / Social Supports   Number in attendance: 14    Building support in recovery is an essential component in success. When one is feeling discouraged, unmotivated, or challenged to sustain recovery, one is able to reach out beyond their deficits to external supports to reinforce the positive outcomes of sobriety. Writer goes through the [employer] of building and maintaining social supports. Goals: Coaching Recovery Skill through [employer]   Handouts: [employer]ance   Method: Group  Attendance: Present  Mood/Affect: Appropriate  Behavior/Socialization: Appropriate to group  Participation: Active  Overall Patient Response to Group: Appropriate to topic  Individual Response to Group: Patient was attentive and engaged in session. Patient provided insight from reading.    Ability to Apply Content to Group: 5

## 2023-11-24 NOTE — PROGRESS NOTE ADULT - ATTENDING COMMENTS
Pt without complaints. Reports feeling at baseline.  - c/w abx  - fu cx data  - per ID, plan to remove arias and culture tip, would also prefer to exchange NT, fu JAZMYNE recs 177.2

## 2023-12-08 NOTE — ED PROVIDER NOTE - NS ED ATTENDING STATEMENT MOD
Tazorac Counseling:  Patient advised that medication is irritating and drying.  Patient may need to apply sparingly and wash off after an hour before eventually leaving it on overnight.  The patient verbalized understanding of the proper use and possible adverse effects of tazorac.  All of the patient's questions and concerns were addressed. Bexarotene Pregnancy And Lactation Text: This medication is Pregnancy Category X and should not be given to women who are pregnant or may become pregnant. This medication should not be used if you are breast feeding. Propranolol Pregnancy And Lactation Text: This medication is Pregnancy Category C and it isn't known if it is safe during pregnancy. It is excreted in breast milk. Azithromycin Counseling:  I discussed with the patient the risks of azithromycin including but not limited to GI upset, allergic reaction, drug rash, diarrhea, and yeast infections. Rinvoq Counseling: I discussed with the patient the risks of Rinvoq therapy including but not limited to upper respiratory tract infections, shingles, cold sores, bronchitis, nausea, cough, fever, acne, and headache. Live vaccines should be avoided.  This medication has been linked to serious infections; higher rate of mortality; malignancy and lymphoproliferative disorders; major adverse cardiovascular events; thrombosis; thrombocytopenia, anemia, and neutropenia; lipid elevations; liver enzyme elevations; and gastrointestinal perforations. Xolair Pregnancy And Lactation Text: This medication is Pregnancy Category B and is considered safe during pregnancy. This medication is excreted in breast milk. Zoryve Pregnancy And Lactation Text: It is unknown if this medication can cause problems during pregnancy and breastfeeding. Griseofulvin Pregnancy And Lactation Text: This medication is Pregnancy Category X and is known to cause serious birth defects. It is unknown if this medication is excreted in breast milk but breast feeding should be avoided. Oral Minoxidil Counseling- I discussed with the patient the risks of oral minoxidil including but not limited to shortness of breath, swelling of the feet or ankles, dizziness, lightheadedness, unwanted hair growth and allergic reaction.  The patient verbalized understanding of the proper use and possible adverse effects of oral minoxidil.  All of the patient's questions and concerns were addressed. Cimzia Pregnancy And Lactation Text: This medication crosses the placenta but can be considered safe in certain situations. Cimzia may be excreted in breast milk. Benzoyl Peroxide Counseling: Patient counseled that medicine may cause skin irritation and bleach clothing.  In the event of skin irritation, the patient was advised to reduce the amount of the drug applied or use it less frequently.   The patient verbalized understanding of the proper use and possible adverse effects of benzoyl peroxide.  All of the patient's questions and concerns were addressed. Dutasteride Pregnancy And Lactation Text: This medication is absolutely contraindicated in women, especially during pregnancy and breast feeding. Feminization of male fetuses is possible if taking while pregnant. Clofazimine Pregnancy And Lactation Text: This medication is Pregnancy Category C and isn't considered safe during pregnancy. It is excreted in breast milk. Ilumya Counseling: I discussed with the patient the risks of tildrakizumab including but not limited to immunosuppression, malignancy, posterior leukoencephalopathy syndrome, and serious infections.  The patient understands that monitoring is required including a PPD at baseline and must alert us or the primary physician if symptoms of infection or other concerning signs are noted. Methotrexate Pregnancy And Lactation Text: This medication is Pregnancy Category X and is known to cause fetal harm. This medication is excreted in breast milk. Drysol Pregnancy And Lactation Text: This medication is considered safe during pregnancy and breast feeding. Skyrizi Counseling: I discussed with the patient the risks of risankizumab-rzaa including but not limited to immunosuppression, and serious infections.  The patient understands that monitoring is required including a PPD at baseline and must alert us or the primary physician if symptoms of infection or other concerning signs are noted. Topical Sulfur Applications Counseling: Topical Sulfur Counseling: Patient counseled that this medication may cause skin irritation or allergic reactions.  In the event of skin irritation, the patient was advised to reduce the amount of the drug applied or use it less frequently.   The patient verbalized understanding of the proper use and possible adverse effects of topical sulfur application.  All of the patient's questions and concerns were addressed. Hydroxychloroquine Pregnancy And Lactation Text: This medication has been shown to cause fetal harm but it isn't assigned a Pregnancy Risk Category. There are small amounts excreted in breast milk. Hydroxyzine Pregnancy And Lactation Text: This medication is not safe during pregnancy and should not be taken. It is also excreted in breast milk and breast feeding isn't recommended. Erythromycin Counseling:  I discussed with the patient the risks of erythromycin including but not limited to GI upset, allergic reaction, drug rash, diarrhea, increase in liver enzymes, and yeast infections. Finasteride Counseling:  I discussed with the patient the risks of use of finasteride including but not limited to decreased libido, decreased ejaculate volume, gynecomastia, and depression. Women should not handle medication.  All of the patient's questions and concerns were addressed. Elidel Counseling: Patient may experience a mild burning sensation during topical application. Elidel is not approved in children less than 2 years of age. There have been case reports of hematologic and skin malignancies in patients using topical calcineurin inhibitors although causality is questionable. Klisyri Pregnancy And Lactation Text: It is unknown if this medication can harm a developing fetus or if it is excreted in breast milk. Qbrexza Pregnancy And Lactation Text: There is no available data on Qbrexza use in pregnant women.  There is no available data on Qbrexza use in lactation. Rifampin Pregnancy And Lactation Text: This medication is Pregnancy Category C and it isn't know if it is safe during pregnancy. It is also excreted in breast milk and should not be used if you are breast feeding. Cosentyx Counseling:  I discussed with the patient the risks of Cosentyx including but not limited to worsening of Crohn's disease, immunosuppression, allergic reactions and infections.  The patient understands that monitoring is required including a PPD at baseline and must alert us or the primary physician if symptoms of infection or other concerning signs are noted. Azithromycin Pregnancy And Lactation Text: This medication is considered safe during pregnancy and is also secreted in breast milk. Sarecycline Counseling: Patient advised regarding possible photosensitivity and discoloration of the teeth, skin, lips, tongue and gums.  Patient instructed to avoid sunlight, if possible.  When exposed to sunlight, patients should wear protective clothing, sunglasses, and sunscreen.  The patient was instructed to call the office immediately if the following severe adverse effects occur:  hearing changes, easy bruising/bleeding, severe headache, or vision changes.  The patient verbalized understanding of the proper use and possible adverse effects of sarecycline.  All of the patient's questions and concerns were addressed. Rhofade Counseling: Rhofade is a topical medication which can decrease superficial blood flow where applied. Side effects are uncommon and include stinging, redness and allergic reactions. Minoxidil Counseling: Minoxidil is a topical medication which can increase blood flow where it is applied. It is uncertain how this medication increases hair growth. Side effects are uncommon and include stinging and allergic reactions. Zyclara Counseling:  I discussed with the patient the risks of imiquimod including but not limited to erythema, scaling, itching, weeping, crusting, and pain.  Patient understands that the inflammatory response to imiquimod is variable from person to person and was educated regarded proper titration schedule.  If flu-like symptoms develop, patient knows to discontinue the medication and contact us. Rinvoq Pregnancy And Lactation Text: Based on animal studies, Rinvoq may cause embryo-fetal harm when administered to pregnant women.  The medication should not be used in pregnancy.  Breastfeeding is not recommended during treatment and for 6 days after the last dose. Colchicine Counseling:  Patient counseled regarding adverse effects including but not limited to stomach upset (nausea, vomiting, stomach pain, or diarrhea).  Patient instructed to limit alcohol consumption while taking this medication.  Colchicine may reduce blood counts especially with prolonged use.  The patient understands that monitoring of kidney function and blood counts may be required, especially at baseline. The patient verbalized understanding of the proper use and possible adverse effects of colchicine.  All of the patient's questions and concerns were addressed. SSKI Counseling:  I discussed with the patient the risks of SSKI including but not limited to thyroid abnormalities, metallic taste, GI upset, fever, headache, acne, arthralgias, paraesthesias, lymphadenopathy, easy bleeding, arrhythmias, and allergic reaction. Prednisone Counseling:  I discussed with the patient the risks of prolonged use of prednisone including but not limited to weight gain, insomnia, osteoporosis, mood changes, diabetes, susceptibility to infection, glaucoma and high blood pressure.  In cases where prednisone use is prolonged, patients should be monitored with blood pressure checks, serum glucose levels and an eye exam.  Additionally, the patient may need to be placed on GI prophylaxis, PCP prophylaxis, and calcium and vitamin D supplementation and/or a bisphosphonate.  The patient verbalized understanding of the proper use and the possible adverse effects of prednisone.  All of the patient's questions and concerns were addressed. Benzoyl Peroxide Pregnancy And Lactation Text: This medication is Pregnancy Category C. It is unknown if benzoyl peroxide is excreted in breast milk. Tazorac Pregnancy And Lactation Text: This medication is not safe during pregnancy. It is unknown if this medication is excreted in breast milk. Ilumya Pregnancy And Lactation Text: The risk during pregnancy and breastfeeding is uncertain with this medication. Oral Minoxidil Pregnancy And Lactation Text: This medication should only be used when clearly needed if you are pregnant, attempting to become pregnant or breast feeding. Isotretinoin Counseling: Patient should get monthly blood tests, not donate blood, not drive at night if vision affected, not share medication, and not undergo elective surgery for 6 months after tx completed. Side effects reviewed, pt to contact office should one occur. Itraconazole Counseling:  I discussed with the patient the risks of itraconazole including but not limited to liver damage, nausea/vomiting, neuropathy, and severe allergy.  The patient understands that this medication is best absorbed when taken with acidic beverages such as non-diet cola or ginger ale.  The patient understands that monitoring is required including baseline LFTs and repeat LFTs at intervals.  The patient understands that they are to contact us or the primary physician if concerning signs are noted. Prednisone Pregnancy And Lactation Text: This medication is Pregnancy Category C and it isn't know if it is safe during pregnancy. This medication is excreted in breast milk. Itraconazole Pregnancy And Lactation Text: This medication is Pregnancy Category C and it isn't know if it is safe during pregnancy. It is also excreted in breast milk. Erythromycin Pregnancy And Lactation Text: This medication is Pregnancy Category B and is considered safe during pregnancy. It is also excreted in breast milk. Isotretinoin Pregnancy And Lactation Text: This medication is Pregnancy Category X and is considered extremely dangerous during pregnancy. It is unknown if it is excreted in breast milk. I have personally performed a face to face diagnostic evaluation on this patient. I have reviewed the ACP note and agree with the history, exam and plan of care, except as noted. Infliximab Counseling:  I discussed with the patient the risks of infliximab including but not limited to myelosuppression, immunosuppression, autoimmune hepatitis, demyelinating diseases, lymphoma, and serious infections.  The patient understands that monitoring is required including a PPD at baseline and must alert us or the primary physician if symptoms of infection or other concerning signs are noted. Sotyktu Counseling:  I discussed the most common side effects of Sotyktu including: common cold, sore throat, sinus infections, cold sores, canker sores, folliculitis, and acne.  I also discussed more serious side effects of Sotyktu including but not limited to: serious allergic reactions; increased risk for infections such as TB; cancers such as lymphomas; rhabdomyolysis and elevated CPK; and elevated triglycerides and liver enzymes.  Low Dose Naltrexone Counseling- I discussed with the patient the potential risks and side effects of low dose naltrexone including but not limited to: more vivid dreams, headaches, nausea, vomiting, abdominal pain, fatigue, dizziness, and anxiety. Topical Sulfur Applications Pregnancy And Lactation Text: This medication is considered safe during pregnancy and breast feeding secondary to limited systemic absorption. Ketoconazole Pregnancy And Lactation Text: This medication is Pregnancy Category C and it isn't know if it is safe during pregnancy. It is also excreted in breast milk and breast feeding isn't recommended. Elidel Pregnancy And Lactation Text: This medication is Pregnancy Category C. It is unknown if this medication is excreted in breast milk. Finasteride Pregnancy And Lactation Text: This medication is absolutely contraindicated during pregnancy. It is unknown if it is excreted in breast milk. Erivedge Counseling- I discussed with the patient the risks of Erivedge including but not limited to nausea, vomiting, diarrhea, constipation, weight loss, changes in the sense of taste, decreased appetite, muscle spasms, and hair loss.  The patient verbalized understanding of the proper use and possible adverse effects of Erivedge.  All of the patient's questions and concerns were addressed. Sarecycline Pregnancy And Lactation Text: This medication is Pregnancy Category D and not consider safe during pregnancy. It is also excreted in breast milk. Cosentyx Pregnancy And Lactation Text: This medication is Pregnancy Category B and is considered safe during pregnancy. It is unknown if this medication is excreted in breast milk. Terbinafine Counseling: Patient counseling regarding adverse effects of terbinafine including but not limited to headache, diarrhea, rash, upset stomach, liver function test abnormalities, itching, taste/smell disturbance, nausea, abdominal pain, and flatulence.  There is a rare possibility of liver failure that can occur when taking terbinafine.  The patient understands that a baseline LFT and kidney function test may be required. The patient verbalized understanding of the proper use and possible adverse effects of terbinafine.  All of the patient's questions and concerns were addressed. Stelara Counseling:  I discussed with the patient the risks of ustekinumab including but not limited to immunosuppression, malignancy, posterior leukoencephalopathy syndrome, and serious infections.  The patient understands that monitoring is required including a PPD at baseline and must alert us or the primary physician if symptoms of infection or other concerning signs are noted. Low Dose Naltrexone Pregnancy And Lactation Text: Naltrexone is pregnancy category C.  There have been no adequate and well-controlled studies in pregnant women.  It should be used in pregnancy only if the potential benefit justifies the potential risk to the fetus.   Limited data indicates that naltrexone is minimally excreted into breastmilk. Sski Pregnancy And Lactation Text: This medication is Pregnancy Category D and isn't considered safe during pregnancy. It is excreted in breast milk. Minoxidil Pregnancy And Lactation Text: This medication has not been assigned a Pregnancy Risk Category but animal studies failed to show danger with the topical medication. It is unknown if the medication is excreted in breast milk. Carac Counseling:  I discussed with the patient the risks of Carac including but not limited to erythema, scaling, itching, weeping, crusting, and pain. Albendazole Counseling:  I discussed with the patient the risks of albendazole including but not limited to cytopenia, kidney damage, nausea/vomiting and severe allergy.  The patient understands that this medication is being used in an off-label manner. Otezla Counseling: The side effects of Otezla were discussed with the patient, including but not limited to worsening or new depression, weight loss, diarrhea, nausea, upper respiratory tract infection, and headache. Patient instructed to call the office should any adverse effect occur.  The patient verbalized understanding of the proper use and possible adverse effects of Otezla.  All the patient's questions and concerns were addressed. Topical Clindamycin Counseling: Patient counseled that this medication may cause skin irritation or allergic reactions.  In the event of skin irritation, the patient was advised to reduce the amount of the drug applied or use it less frequently.   The patient verbalized understanding of the proper use and possible adverse effects of clindamycin.  All of the patient's questions and concerns were addressed. Cellcept Counseling:  I discussed with the patient the risks of mycophenolate mofetil including but not limited to infection/immunosuppression, GI upset, hypokalemia, hypercholesterolemia, bone marrow suppression, lymphoproliferative disorders, malignancy, GI ulceration/bleed/perforation, colitis, interstitial lung disease, kidney failure, progressive multifocal leukoencephalopathy, and birth defects.  The patient understands that monitoring is required including a baseline creatinine and regular CBC testing. In addition, patient must alert us immediately if symptoms of infection or other concerning signs are noted. Bactrim Counseling:  I discussed with the patient the risks of sulfa antibiotics including but not limited to GI upset, allergic reaction, drug rash, diarrhea, dizziness, photosensitivity, and yeast infections.  Rarely, more serious reactions can occur including but not limited to aplastic anemia, agranulocytosis, methemoglobinemia, blood dyscrasias, liver or kidney failure, lung infiltrates or desquamative/blistering drug rashes. High Dose Vitamin A Counseling: Side effects reviewed, pt to contact office should one occur. Dapsone Counseling: I discussed with the patient the risks of dapsone including but not limited to hemolytic anemia, agranulocytosis, rashes, methemoglobinemia, kidney failure, peripheral neuropathy, headaches, GI upset, and liver toxicity.  Patients who start dapsone require monitoring including baseline LFTs and weekly CBCs for the first month, then every month thereafter.  The patient verbalized understanding of the proper use and possible adverse effects of dapsone.  All of the patient's questions and concerns were addressed. Sotyktu Pregnancy And Lactation Text: There is insufficient data to evaluate whether or not Sotyktu is safe to use during pregnancy.   It is not known if Sotyktu passes into breast milk and whether or not it is safe to use when breastfeeding.   Ketoconazole Counseling:   Patient counseled regarding improving absorption with orange juice.  Adverse effects include but are not limited to breast enlargement, headache, diarrhea, nausea, upset stomach, liver function test abnormalities, taste disturbance, and stomach pain.  There is a rare possibility of liver failure that can occur when taking ketoconazole. The patient understands that monitoring of LFTs may be required, especially at baseline. The patient verbalized understanding of the proper use and possible adverse effects of ketoconazole.  All of the patient's questions and concerns were addressed. Thalidomide Counseling: I discussed with the patient the risks of thalidomide including but not limited to birth defects, anxiety, weakness, chest pain, dizziness, cough and severe allergy. Bactrim Pregnancy And Lactation Text: This medication is Pregnancy Category D and is known to cause fetal risk.  It is also excreted in breast milk. Albendazole Pregnancy And Lactation Text: This medication is Pregnancy Category C and it isn't known if it is safe during pregnancy. It is also excreted in breast milk. Topical Clindamycin Pregnancy And Lactation Text: This medication is Pregnancy Category B and is considered safe during pregnancy. It is unknown if it is excreted in breast milk. Otezla Pregnancy And Lactation Text: This medication is Pregnancy Category C and it isn't known if it is safe during pregnancy. It is unknown if it is excreted in breast milk. Carac Pregnancy And Lactation Text: This medication is Pregnancy Category X and contraindicated in pregnancy and in women who may become pregnant. It is unknown if this medication is excreted in breast milk. Birth Control Pills Counseling: Birth Control Pill Counseling: I discussed with the patient the potential side effects of OCPs including but not limited to increased risk of stroke, heart attack, thrombophlebitis, deep venous thrombosis, hepatic adenomas, breast changes, GI upset, headaches, and depression.  The patient verbalized understanding of the proper use and possible adverse effects of OCPs. All of the patient's questions and concerns were addressed. Rhofade Pregnancy And Lactation Text: This medication has not been assigned a Pregnancy Risk Category. It is unknown if the medication is excreted in breast milk. Erivedge Pregnancy And Lactation Text: This medication is Pregnancy Category X and is absolutely contraindicated during pregnancy. It is unknown if it is excreted in breast milk. Eucrisa Counseling: Patient may experience a mild burning sensation during topical application. Eucrisa is not approved in children less than 3 months of age. Wartpeel Counseling:  I discussed with the patient the risks of Wartpeel including but not limited to erythema, scaling, itching, weeping, crusting, and pain. Metronidazole Counseling:  I discussed with the patient the risks of metronidazole including but not limited to seizures, nausea/vomiting, a metallic taste in the mouth, nausea/vomiting and severe allergy. Libtayo Counseling- I discussed with the patient the risks of Libtayo including but not limited to nausea, vomiting, diarrhea, and bone or muscle pain.  The patient verbalized understanding of the proper use and possible adverse effects of Libtayo.  All of the patient's questions and concerns were addressed. Solaraze Counseling:  I discussed with the patient the risks of Solaraze including but not limited to erythema, scaling, itching, weeping, crusting, and pain. Metronidazole Pregnancy And Lactation Text: This medication is Pregnancy Category B and considered safe during pregnancy.  It is also excreted in breast milk. Birth Control Pills Pregnancy And Lactation Text: This medication should be avoided if pregnant and for the first 30 days post-partum. Tetracycline Counseling: Patient counseled regarding possible photosensitivity and increased risk for sunburn.  Patient instructed to avoid sunlight, if possible.  When exposed to sunlight, patients should wear protective clothing, sunglasses, and sunscreen.  The patient was instructed to call the office immediately if the following severe adverse effects occur:  hearing changes, easy bruising/bleeding, severe headache, or vision changes.  The patient verbalized understanding of the proper use and possible adverse effects of tetracycline.  All of the patient's questions and concerns were addressed. Patient understands to avoid pregnancy while on therapy due to potential birth defects. Cibinqo Counseling: I discussed with the patient the risks of Cibinqo therapy including but not limited to common cold, nausea, headache, cold sores, increased blood CPK levels, dizziness, UTIs, fatigue, acne, and vomitting. Live vaccines should be avoided.  This medication has been linked to serious infections; higher rate of mortality; malignancy and lymphoproliferative disorders; major adverse cardiovascular events; thrombosis; thrombocytopenia and lymphopenia; lipid elevations; and retinal detachment. Cellcept Pregnancy And Lactation Text: This medication is Pregnancy Category D and isn't considered safe during pregnancy. It is unknown if this medication is excreted in breast milk. Mirvaso Counseling: Mirvaso is a topical medication which can decrease superficial blood flow where applied. Side effects are uncommon and include stinging, redness and allergic reactions. Dupixent Counseling: I discussed with the patient the risks of dupilumab including but not limited to eye inflammation and irritation, cold sores, injection site reactions, allergic reactions and increased risk of parasitic infection. The patient understands that monitoring is required and they must alert us or the primary physician if symptoms of infection or other concerning signs are noted. Niacinamide Counseling: I recommended taking niacin or niacinamide, also know as vitamin B3, twice daily. Recent evidence suggests that taking vitamin B3 (500 mg twice daily) can reduce the risk of actinic keratoses and non-melanoma skin cancers. Side effects of vitamin B3 include flushing and headache. Terbinafine Pregnancy And Lactation Text: This medication is Pregnancy Category B and is considered safe during pregnancy. It is also excreted in breast milk and breast feeding isn't recommended. Cephalexin Counseling: I counseled the patient regarding use of cephalexin as an antibiotic for prophylactic and/or therapeutic purposes. Cephalexin (commonly prescribed under brand name Keflex) is a cephalosporin antibiotic which is active against numerous classes of bacteria, including most skin bacteria. Side effects may include nausea, diarrhea, gastrointestinal upset, rash, hives, yeast infections, and in rare cases, hepatitis, kidney disease, seizures, fever, confusion, neurologic symptoms, and others. Patients with severe allergies to penicillin medications are cautioned that there is about a 10% incidence of cross-reactivity with cephalosporins. When possible, patients with penicillin allergies should use alternatives to cephalosporins for antibiotic therapy. Topical Ketoconazole Counseling: Patient counseled that this medication may cause skin irritation or allergic reactions.  In the event of skin irritation, the patient was advised to reduce the amount of the drug applied or use it less frequently.   The patient verbalized understanding of the proper use and possible adverse effects of ketoconazole.  All of the patient's questions and concerns were addressed. Ivermectin Counseling:  Patient instructed to take medication on an empty stomach with a full glass of water.  Patient informed of potential adverse effects including but not limited to nausea, diarrhea, dizziness, itching, and swelling of the extremities or lymph nodes.  The patient verbalized understanding of the proper use and possible adverse effects of ivermectin.  All of the patient's questions and concerns were addressed. Cibinqo Pregnancy And Lactation Text: It is unknown if this medication will adversely affect pregnancy or breast feeding.  You should not take this medication if you are currently pregnant or planning a pregnancy or while breastfeeding. Cyclophosphamide Counseling:  I discussed with the patient the risks of cyclophosphamide including but not limited to hair loss, hormonal abnormalities, decreased fertility, abdominal pain, diarrhea, nausea and vomiting, bone marrow suppression and infection. The patient understands that monitoring is required while taking this medication. Dupixent Pregnancy And Lactation Text: This medication likely crosses the placenta but the risk for the fetus is uncertain. This medication is excreted in breast milk. Dapsone Pregnancy And Lactation Text: This medication is Pregnancy Category C and is not considered safe during pregnancy or breast feeding. Opioid Counseling: I discussed with the patient the potential side effects of opioids including but not limited to addiction, altered mental status, and depression. I stressed avoiding alcohol, benzodiazepines, muscle relaxants and sleep aids unless specifically okayed by a physician. The patient verbalized understanding of the proper use and possible adverse effects of opioids. All of the patient's questions and concerns were addressed. They were instructed to flush the remaining pills down the toilet if they did not need them for pain. Xeljanz Counseling: I discussed with the patient the risks of Xeljanz therapy including increased risk of infection, liver issues, headache, diarrhea, or cold symptoms. Live vaccines should be avoided. They were instructed to call if they have any problems. Oxybutynin Counseling:  I discussed with the patient the risks of oxybutynin including but not limited to skin rash, drowsiness, dry mouth, difficulty urinating, and blurred vision. Calcipotriene Counseling:  I discussed with the patient the risks of calcipotriene including but not limited to erythema, scaling, itching, and irritation. Rituxan Counseling:  I discussed with the patient the risks of Rituxan infusions. Side effects can include infusion reactions, severe drug rashes including mucocutaneous reactions, reactivation of latent hepatitis and other infections and rarely progressive multifocal leukoencephalopathy.  All of the patient's questions and concerns were addressed. High Dose Vitamin A Pregnancy And Lactation Text: High dose vitamin A therapy is contraindicated during pregnancy and breast feeding. Winlevi Counseling:  I discussed with the patient the risks of topical clascoterone including but not limited to erythema, scaling, itching, and stinging. Patient voiced their understanding. Opioid Pregnancy And Lactation Text: These medications can lead to premature delivery and should be avoided during pregnancy. These medications are also present in breast milk in small amounts. Minocycline Counseling: Patient advised regarding possible photosensitivity and discoloration of the teeth, skin, lips, tongue and gums.  Patient instructed to avoid sunlight, if possible.  When exposed to sunlight, patients should wear protective clothing, sunglasses, and sunscreen.  The patient was instructed to call the office immediately if the following severe adverse effects occur:  hearing changes, easy bruising/bleeding, severe headache, or vision changes.  The patient verbalized understanding of the proper use and possible adverse effects of minocycline.  All of the patient's questions and concerns were addressed. Xelmiriz Pregnancy And Lactation Text: This medication is Pregnancy Category D and is not considered safe during pregnancy.  The risk during breast feeding is also uncertain. Rituxan Pregnancy And Lactation Text: This medication is Pregnancy Category C and it isn't know if it is safe during pregnancy. It is unknown if this medication is excreted in breast milk but similar antibodies are known to be excreted. Gabapentin Counseling: I discussed with the patient the risks of gabapentin including but not limited to dizziness, somnolence, fatigue and ataxia. Niacinamide Pregnancy And Lactation Text: These medications are considered safe during pregnancy. Taltz Counseling: I discussed with the patient the risks of ixekizumab including but not limited to immunosuppression, serious infections, worsening of inflammatory bowel disease and drug reactions.  The patient understands that monitoring is required including a PPD at baseline and must alert us or the primary physician if symptoms of infection or other concerning signs are noted. Solaraze Pregnancy And Lactation Text: This medication is Pregnancy Category B and is considered safe. There is some data to suggest avoiding during the third trimester. It is unknown if this medication is excreted in breast milk. Spironolactone Counseling: Patient advised regarding risks of diarrhea, abdominal pain, hyperkalemia, birth defects (for female patients), liver toxicity and renal toxicity. The patient may need blood work to monitor liver and kidney function and potassium levels while on therapy. The patient verbalized understanding of the proper use and possible adverse effects of spironolactone.  All of the patient's questions and concerns were addressed. Hydroquinone Counseling:  Patient advised that medication may result in skin irritation, lightening (hypopigmentation), dryness, and burning.  In the event of skin irritation, the patient was advised to reduce the amount of the drug applied or use it less frequently.  Rarely, spots that are treated with hydroquinone can become darker (pseudoochronosis).  Should this occur, patient instructed to stop medication and call the office. The patient verbalized understanding of the proper use and possible adverse effects of hydroquinone.  All of the patient's questions and concerns were addressed. Libtayo Pregnancy And Lactation Text: This medication is contraindicated in pregnancy and when breast feeding. Calcipotriene Pregnancy And Lactation Text: The use of this medication during pregnancy or lactation is not recommended as there is insufficient data. Enbrel Counseling:  I discussed with the patient the risks of etanercept including but not limited to myelosuppression, immunosuppression, autoimmune hepatitis, demyelinating diseases, lymphoma, and infections.  The patient understands that monitoring is required including a PPD at baseline and must alert us or the primary physician if symptoms of infection or other concerning signs are noted. Litfulo Counseling: I discussed with the patient the risks of Litfulo therapy including but not limited to upper respiratory tract infections, shingles, cold sores, and nausea. Live vaccines should be avoided.  This medication has been linked to serious infections; higher rate of mortality; malignancy and lymphoproliferative disorders; major adverse cardiovascular events; thrombosis; gastrointestinal perforations; neutropenia; lymphopenia; anemia; liver enzyme elevations; and lipid elevations. Detail Level: Simple Odomzo Counseling- I discussed with the patient the risks of Odomzo including but not limited to nausea, vomiting, diarrhea, constipation, weight loss, changes in the sense of taste, decreased appetite, muscle spasms, and hair loss.  The patient verbalized understanding of the proper use and possible adverse effects of Odomzo.  All of the patient's questions and concerns were addressed. Soolantra Counseling: I discussed with the patients the risks of topial Soolantra. This is a medicine which decreases the number of mites and inflammation in the skin. You experience burning, stinging, eye irritation or allergic reactions.  Please call our office if you develop any problems from using this medication. Nsaids Counseling: NSAID Counseling: I discussed with the patient that NSAIDs should be taken with food. Prolonged use of NSAIDs can result in the development of stomach ulcers.  Patient advised to stop taking NSAIDs if abdominal pain occurs.  The patient verbalized understanding of the proper use and possible adverse effects of NSAIDs.  All of the patient's questions and concerns were addressed. Opzelura Counseling:  I discussed with the patient the risks of Opzelura including but not limited to nasopharngitis, bronchitis, ear infection, eosinophila, hives, diarrhea, folliculitis, tonsillitis, and rhinorrhea.  Taken orally, this medication has been linked to serious infections; higher rate of mortality; malignancy and lymphoproliferative disorders; major adverse cardiovascular events; thrombosis; thrombocytopenia, anemia, and neutropenia; and lipid elevations. Aklief counseling:  Patient advised to apply a pea-sized amount only at bedtime and wait 30 minutes after washing their face before applying.  If too drying, patient may add a non-comedogenic moisturizer.  The most commonly reported side effects including irritation, redness, scaling, dryness, stinging, burning, itching, and increased risk of sunburn.  The patient verbalized understanding of the proper use and possible adverse effects of retinoids.  All of the patient's questions and concerns were addressed. Tranexamic Acid Counseling:  Patient advised of the small risk of bleeding problems with tranexamic acid. They were also instructed to call if they developed any nausea, vomiting or diarrhea. All of the patient's questions and concerns were addressed. Cantharidin Counseling:  I discussed with the patient the risks of Cantharidin including but not limited to pain, redness, burning, itching, and blistering. Cyclophosphamide Pregnancy And Lactation Text: This medication is Pregnancy Category D and it isn't considered safe during pregnancy. This medication is excreted in breast milk. Cimetidine Counseling:  I discussed with the patient the risks of Cimetidine including but not limited to gynecomastia, headache, diarrhea, nausea, drowsiness, arrhythmias, pancreatitis, skin rashes, psychosis, bone marrow suppression and kidney toxicity. Cephalexin Pregnancy And Lactation Text: This medication is Pregnancy Category B and considered safe during pregnancy.  It is also excreted in breast milk but can be used safely for shorter doses. Siliq Counseling:  I discussed with the patient the risks of Siliq including but not limited to new or worsening depression, suicidal thoughts and behavior, immunosuppression, malignancy, posterior leukoencephalopathy syndrome, and serious infections.  The patient understands that monitoring is required including a PPD at baseline and must alert us or the primary physician if symptoms of infection or other concerning signs are noted. There is also a special program designed to monitor depression which is required with Siliq. Clindamycin Counseling: I counseled the patient regarding use of clindamycin as an antibiotic for prophylactic and/or therapeutic purposes. Clindamycin is active against numerous classes of bacteria, including skin bacteria. Side effects may include nausea, diarrhea, gastrointestinal upset, rash, hives, yeast infections, and in rare cases, colitis. Topical Metronidazole Counseling: Metronidazole is a topical antibiotic medication. You may experience burning, stinging, redness, or allergic reactions.  Please call our office if you develop any problems from using this medication. Tranexamic Acid Pregnancy And Lactation Text: It is unknown if this medication is safe during pregnancy or breast feeding. Winlevi Pregnancy And Lactation Text: This medication is considered safe during pregnancy and breastfeeding. Spironolactone Pregnancy And Lactation Text: This medication can cause feminization of the male fetus and should be avoided during pregnancy. The active metabolite is also found in breast milk. Cantharidin Pregnancy And Lactation Text: This medication has not been proven safe during pregnancy. It is unknown if this medication is excreted in breast milk. Include Pregnancy/Lactation Warning?: No Tremfya Counseling: I discussed with the patient the risks of guselkumab including but not limited to immunosuppression, serious infections, and drug reactions.  The patient understands that monitoring is required including a PPD at baseline and must alert us or the primary physician if symptoms of infection or other concerning signs are noted. Soolantra Pregnancy And Lactation Text: This medication is Pregnancy Category C. This medication is considered safe during breast feeding. Quinolones Counseling:  I discussed with the patient the risks of fluoroquinolones including but not limited to GI upset, allergic reaction, drug rash, diarrhea, dizziness, photosensitivity, yeast infections, liver function test abnormalities, tendonitis/tendon rupture. Adbry Counseling: I discussed with the patient the risks of tralokinumab including but not limited to eye infection and irritation, cold sores, injection site reactions, worsening of asthma, allergic reactions and increased risk of parasitic infection.  Live vaccines should be avoided while taking tralokinumab. The patient understands that monitoring is required and they must alert us or the primary physician if symptoms of infection or other concerning signs are noted. VTAMA Counseling: I discussed with the patient that VTAMA is not for use in the eyes, mouth or mouth. They should call the office if they develop any signs of allergic reactions to VTAMA. The patient verbalized understanding of the proper use and possible adverse effects of VTAMA.  All of the patient's questions and concerns were addressed. Litfulo Pregnancy And Lactation Text: Based on animal studies, Lifulo may cause embryo-fetal harm when administered to pregnant women.  The medication should not be used in pregnancy.  Breastfeeding is not recommended during treatment. Fluconazole Counseling:  Patient counseled regarding adverse effects of fluconazole including but not limited to headache, diarrhea, nausea, upset stomach, liver function test abnormalities, taste disturbance, and stomach pain.  There is a rare possibility of liver failure that can occur when taking fluconazole.  The patient understands that monitoring of LFTs and kidney function test may be required, especially at baseline. The patient verbalized understanding of the proper use and possible adverse effects of fluconazole.  All of the patient's questions and concerns were addressed. Arava Counseling:  Patient counseled regarding adverse effects of Arava including but not limited to nausea, vomiting, abnormalities in liver function tests. Patients may develop mouth sores, rash, diarrhea, and abnormalities in blood counts. The patient understands that monitoring is required including LFTs and blood counts.  There is a rare possibility of scarring of the liver and lung problems that can occur when taking methotrexate. Persistent nausea, loss of appetite, pale stools, dark urine, cough, and shortness of breath should be reported immediately. Patient advised to discontinue Arava treatment and consult with a physician prior to attempting conception. The patient will have to undergo a treatment to eliminate Arava from the body prior to conception. 5-Fu Counseling: 5-Fluorouracil Counseling:  I discussed with the patient the risks of 5-fluorouracil including but not limited to erythema, scaling, itching, weeping, crusting, and pain. Opzelura Pregnancy And Lactation Text: There is insufficient data to evaluate drug-associated risk for major birth defects, miscarriage, or other adverse maternal or fetal outcomes.  There is a pregnancy registry that monitors pregnancy outcomes in pregnant persons exposed to the medication during pregnancy.  It is unknown if this medication is excreted in breast milk.  Do not breastfeed during treatment and for about 4 weeks after the last dose. Cyclosporine Counseling:  I discussed with the patient the risks of cyclosporine including but not limited to hypertension, gingival hyperplasia,myelosuppression, immunosuppression, liver damage, kidney damage, neurotoxicity, lymphoma, and serious infections. The patient understands that monitoring is required including baseline blood pressure, CBC, CMP, lipid panel and uric acid, and then 1-2 times monthly CMP and blood pressure. Aklief Pregnancy And Lactation Text: It is unknown if this medication is safe to use during pregnancy.  It is unknown if this medication is excreted in breast milk.  Breastfeeding women should use the topical cream on the smallest area of the skin for the shortest time needed while breastfeeding.  Do not apply to nipple and areola. Nsaids Pregnancy And Lactation Text: These medications are considered safe up to 30 weeks gestation. It is excreted in breast milk. Acitretin Counseling:  I discussed with the patient the risks of acitretin including but not limited to hair loss, dry lips/skin/eyes, liver damage, hyperlipidemia, depression/suicidal ideation, photosensitivity.  Serious rare side effects can include but are not limited to pancreatitis, pseudotumor cerebri, bony changes, clot formation/stroke/heart attack.  Patient understands that alcohol is contraindicated since it can result in liver toxicity and significantly prolong the elimination of the drug by many years. Topical Metronidazole Pregnancy And Lactation Text: This medication is Pregnancy Category B and considered safe during pregnancy.  It is also considered safe to use while breastfeeding. Clindamycin Pregnancy And Lactation Text: This medication can be used in pregnancy if certain situations. Clindamycin is also present in breast milk. Valtrex Counseling: I discussed with the patient the risks of valacyclovir including but not limited to kidney damage, nausea, vomiting and severe allergy.  The patient understands that if the infection seems to be worsening or is not improving, they are to call. Azathioprine Counseling:  I discussed with the patient the risks of azathioprine including but not limited to myelosuppression, immunosuppression, hepatotoxicity, lymphoma, and infections.  The patient understands that monitoring is required including baseline LFTs, Creatinine, possible TPMP genotyping and weekly CBCs for the first month and then every 2 weeks thereafter.  The patient verbalized understanding of the proper use and possible adverse effects of azathioprine.  All of the patient's questions and concerns were addressed. Doxepin Counseling:  Patient advised that the medication is sedating and not to drive a car after taking this medication. Patient informed of potential adverse effects including but not limited to dry mouth, urinary retention, and blurry vision.  The patient verbalized understanding of the proper use and possible adverse effects of doxepin.  All of the patient's questions and concerns were addressed. Acitretin Pregnancy And Lactation Text: This medication is Pregnancy Category X and should not be given to women who are pregnant or may become pregnant in the future. This medication is excreted in breast milk. Olumiant Counseling: I discussed with the patient the risks of Olumiant therapy including but not limited to upper respiratory tract infections, shingles, cold sores, and nausea. Live vaccines should be avoided.  This medication has been linked to serious infections; higher rate of mortality; malignancy and lymphoproliferative disorders; major adverse cardiovascular events; thrombosis; gastrointestinal perforations; neutropenia; lymphopenia; anemia; liver enzyme elevations; and lipid elevations. Humira Counseling:  I discussed with the patient the risks of adalimumab including but not limited to myelosuppression, immunosuppression, autoimmune hepatitis, demyelinating diseases, lymphoma, and serious infections.  The patient understands that monitoring is required including a PPD at baseline and must alert us or the primary physician if symptoms of infection or other concerning signs are noted. Glycopyrrolate Counseling:  I discussed with the patient the risks of glycopyrrolate including but not limited to skin rash, drowsiness, dry mouth, difficulty urinating, and blurred vision. Imiquimod Counseling:  I discussed with the patient the risks of imiquimod including but not limited to erythema, scaling, itching, weeping, crusting, and pain.  Patient understands that the inflammatory response to imiquimod is variable from person to person and was educated regarded proper titration schedule.  If flu-like symptoms develop, patient knows to discontinue the medication and contact us. Protopic Counseling: Patient may experience a mild burning sensation during topical application. Protopic is not approved in children less than 2 years of age. There have been case reports of hematologic and skin malignancies in patients using topical calcineurin inhibitors although causality is questionable. Adbry Pregnancy And Lactation Text: It is unknown if this medication will adversely affect pregnancy or breast feeding. Glycopyrrolate Pregnancy And Lactation Text: This medication is Pregnancy Category B and is considered safe during pregnancy. It is unknown if it is excreted breast milk. Simponi Counseling:  I discussed with the patient the risks of golimumab including but not limited to myelosuppression, immunosuppression, autoimmune hepatitis, demyelinating diseases, lymphoma, and serious infections.  The patient understands that monitoring is required including a PPD at baseline and must alert us or the primary physician if symptoms of infection or other concerning signs are noted. Olanzapine Counseling- I discussed with the patient the common side effects of olanzapine including but are not limited to: lack of energy, dry mouth, increased appetite, sleepiness, tremor, constipation, dizziness, changes in behavior, or restlessness.  Explained that teenagers are more likely to experience headaches, abdominal pain, pain in the arms or legs, tiredness, and sleepiness.  Serious side effects include but are not limited: increased risk of death in elderly patients who are confused, have memory loss, or dementia-related psychosis; hyperglycemia; increased cholesterol and triglycerides; and weight gain. Azelaic Acid Counseling: Patient counseled that medicine may cause skin irritation and to avoid applying near the eyes.  In the event of skin irritation, the patient was advised to reduce the amount of the drug applied or use it less frequently.   The patient verbalized understanding of the proper use and possible adverse effects of azelaic acid.  All of the patient's questions and concerns were addressed. Picato Counseling:  I discussed with the patient the risks of Picato including but not limited to erythema, scaling, itching, weeping, crusting, and pain. Topical Retinoid counseling:  Patient advised to apply a pea-sized amount only at bedtime and wait 30 minutes after washing their face before applying.  If too drying, patient may add a non-comedogenic moisturizer. The patient verbalized understanding of the proper use and possible adverse effects of retinoids.  All of the patient's questions and concerns were addressed. Dutasteride Counseling: Dustasteride Counseling:  I discussed with the patient the risks of use of dutasteride including but not limited to decreased libido, decreased ejaculate volume, and gynecomastia. Women who can become pregnant should not handle medication.  All of the patient's questions and concerns were addressed. Clofazimine Counseling:  I discussed with the patient the risks of clofazimine including but not limited to skin and eye pigmentation, liver damage, nausea/vomiting, gastrointestinal bleeding and allergy. Griseofulvin Counseling:  I discussed with the patient the risks of griseofulvin including but not limited to photosensitivity, cytopenia, liver damage, nausea/vomiting and severe allergy.  The patient understands that this medication is best absorbed when taken with a fatty meal (e.g., ice cream or french fries). Olumiant Pregnancy And Lactation Text: Based on animal studies, Olumiant may cause embryo-fetal harm when administered to pregnant women.  The medication should not be used in pregnancy.  Breastfeeding is not recommended during treatment. Propranolol Counseling:  I discussed with the patient the risks of propranolol including but not limited to low heart rate, low blood pressure, low blood sugar, restlessness and increased cold sensitivity. They should call the office if they experience any of these side effects. Olanzapine Pregnancy And Lactation Text: This medication is pregnancy category C.   There are no adequate and well controlled trials with olanzapine in pregnant females.  Olanzapine should be used during pregnancy only if the potential benefit justifies the potential risk to the fetus.   In a study in lactating healthy women, olanzapine was excreted in breast milk.  It is recommended that women taking olanzapine should not breast feed. Xolair Counseling:  Patient informed of potential adverse effects including but not limited to fever, muscle aches, rash and allergic reactions.  The patient verbalized understanding of the proper use and possible adverse effects of Xolair.  All of the patient's questions and concerns were addressed. Valtrex Pregnancy And Lactation Text: this medication is Pregnancy Category B and is considered safe during pregnancy. This medication is not directly found in breast milk but it's metabolite acyclovir is present. Protopic Pregnancy And Lactation Text: This medication is Pregnancy Category C. It is unknown if this medication is excreted in breast milk when applied topically. Doxepin Pregnancy And Lactation Text: This medication is Pregnancy Category C and it isn't known if it is safe during pregnancy. It is also excreted in breast milk and breast feeding isn't recommended. Drysol Counseling:  I discussed with the patient the risks of drysol/aluminum chloride including but not limited to skin rash, itching, irritation, burning. Methotrexate Counseling:  Patient counseled regarding adverse effects of methotrexate including but not limited to nausea, vomiting, abnormalities in liver function tests. Patients may develop mouth sores, rash, diarrhea, and abnormalities in blood counts. The patient understands that monitoring is required including LFT's and blood counts.  There is a rare possibility of scarring of the liver and lung problems that can occur when taking methotrexate. Persistent nausea, loss of appetite, pale stools, dark urine, cough, and shortness of breath should be reported immediately. Patient advised to discontinue methotrexate treatment at least three months before attempting to become pregnant.  I discussed the need for folate supplements while taking methotrexate.  These supplements can decrease side effects during methotrexate treatment. The patient verbalized understanding of the proper use and possible adverse effects of methotrexate.  All of the patient's questions and concerns were addressed. Topical Steroids Counseling: I discussed with the patient that prolonged use of topical steroids can result in the increased appearance of superficial blood vessels (telangiectasias), lightening (hypopigmentation) and thinning of the skin (atrophy).  Patient understands to avoid using high potency steroids in skin folds, the groin or the face.  The patient verbalized understanding of the proper use and possible adverse effects of topical steroids.  All of the patient's questions and concerns were addressed. Doxycycline Counseling:  Patient counseled regarding possible photosensitivity and increased risk for sunburn.  Patient instructed to avoid sunlight, if possible.  When exposed to sunlight, patients should wear protective clothing, sunglasses, and sunscreen.  The patient was instructed to call the office immediately if the following severe adverse effects occur:  hearing changes, easy bruising/bleeding, severe headache, or vision changes.  The patient verbalized understanding of the proper use and possible adverse effects of doxycycline.  All of the patient's questions and concerns were addressed. Qbrexza Counseling:  I discussed with the patient the risks of Qbrexza including but not limited to headache, mydriasis, blurred vision, dry eyes, nasal dryness, dry mouth, dry throat, dry skin, urinary hesitation, and constipation.  Local skin reactions including erythema, burning, stinging, and itching can also occur. Doxycycline Pregnancy And Lactation Text: This medication is Pregnancy Category D and not consider safe during pregnancy. It is also excreted in breast milk but is considered safe for shorter treatment courses. Hydroxyzine Counseling: Patient advised that the medication is sedating and not to drive a car after taking this medication.  Patient informed of potential adverse effects including but not limited to dry mouth, urinary retention, and blurry vision.  The patient verbalized understanding of the proper use and possible adverse effects of hydroxyzine.  All of the patient's questions and concerns were addressed. Topical Steroids Applications Pregnancy And Lactation Text: Most topical steroids are considered safe to use during pregnancy and lactation.  Any topical steroid applied to the breast or nipple should be washed off before breastfeeding. Rifampin Counseling: I discussed with the patient the risks of rifampin including but not limited to liver damage, kidney damage, red-orange body fluids, nausea/vomiting and severe allergy. Zoryve Counseling:  I discussed with the patient that Zoryve is not for use in the eyes, mouth or vagina. The most commonly reported side effects include diarrhea, headache, insomnia, application site pain, upper respiratory tract infections, and urinary tract infections.  All of the patient's questions and concerns were addressed. Bexarotene Counseling:  I discussed with the patient the risks of bexarotene including but not limited to hair loss, dry lips/skin/eyes, liver abnormalities, hyperlipidemia, pancreatitis, depression/suicidal ideation, photosensitivity, drug rash/allergic reactions, hypothyroidism, anemia, leukopenia, infection, cataracts, and teratogenicity.  Patient understands that they will need regular blood tests to check lipid profile, liver function tests, white blood cell count, thyroid function tests and pregnancy test if applicable. Cimzia Counseling:  I discussed with the patient the risks of Cimzia including but not limited to immunosuppression, allergic reactions and infections.  The patient understands that monitoring is required including a PPD at baseline and must alert us or the primary physician if symptoms of infection or other concerning signs are noted. Hydroxychloroquine Counseling:  I discussed with the patient that a baseline ophthalmologic exam is needed at the start of therapy and every year thereafter while on therapy. A CBC may also be warranted for monitoring.  The side effects of this medication were discussed with the patient, including but not limited to agranulocytosis, aplastic anemia, seizures, rashes, retinopathy, and liver toxicity. Patient instructed to call the office should any adverse effect occur.  The patient verbalized understanding of the proper use and possible adverse effects of Plaquenil.  All the patient's questions and concerns were addressed. Klisyri Counseling:  I discussed with the patient the risks of Klisyri including but not limited to erythema, scaling, itching, weeping, crusting, and pain.